# Patient Record
Sex: FEMALE | Race: WHITE | NOT HISPANIC OR LATINO | Employment: FULL TIME | URBAN - METROPOLITAN AREA
[De-identification: names, ages, dates, MRNs, and addresses within clinical notes are randomized per-mention and may not be internally consistent; named-entity substitution may affect disease eponyms.]

---

## 2017-08-16 ENCOUNTER — ALLSCRIPTS OFFICE VISIT (OUTPATIENT)
Dept: OTHER | Facility: OTHER | Age: 57
End: 2017-08-16

## 2017-08-16 DIAGNOSIS — Z13.6 ENCOUNTER FOR SCREENING FOR CARDIOVASCULAR DISORDERS: ICD-10-CM

## 2017-08-16 DIAGNOSIS — F32.9 MAJOR DEPRESSIVE DISORDER, SINGLE EPISODE: ICD-10-CM

## 2017-08-31 ENCOUNTER — APPOINTMENT (OUTPATIENT)
Dept: LAB | Facility: CLINIC | Age: 57
End: 2017-08-31
Payer: COMMERCIAL

## 2017-08-31 DIAGNOSIS — F32.9 MAJOR DEPRESSIVE DISORDER, SINGLE EPISODE: ICD-10-CM

## 2017-08-31 DIAGNOSIS — Z13.6 ENCOUNTER FOR SCREENING FOR CARDIOVASCULAR DISORDERS: ICD-10-CM

## 2017-08-31 LAB
CHOLEST SERPL-MCNC: 182 MG/DL (ref 50–200)
HDLC SERPL-MCNC: 68 MG/DL (ref 40–60)
LDLC SERPL CALC-MCNC: 102 MG/DL (ref 0–100)
TRIGL SERPL-MCNC: 59 MG/DL
TSH SERPL DL<=0.05 MIU/L-ACNC: 1.37 UIU/ML (ref 0.36–3.74)

## 2017-08-31 PROCEDURE — 84443 ASSAY THYROID STIM HORMONE: CPT

## 2017-08-31 PROCEDURE — 36415 COLL VENOUS BLD VENIPUNCTURE: CPT

## 2017-08-31 PROCEDURE — 80061 LIPID PANEL: CPT

## 2017-10-09 ENCOUNTER — ALLSCRIPTS OFFICE VISIT (OUTPATIENT)
Dept: OTHER | Facility: OTHER | Age: 57
End: 2017-10-09

## 2017-10-10 ENCOUNTER — HOSPITAL ENCOUNTER (OUTPATIENT)
Dept: RADIOLOGY | Age: 57
Discharge: HOME | End: 2017-10-10
Payer: COMMERCIAL

## 2017-10-10 DIAGNOSIS — Z12.31 ENCOUNTER FOR SCREENING MAMMOGRAM FOR MALIGNANT NEOPLASM OF BREAST: ICD-10-CM

## 2017-10-10 PROCEDURE — G0202 SCR MAMMO BI INCL CAD: HCPCS

## 2017-10-12 ENCOUNTER — GENERIC CONVERSION - ENCOUNTER (OUTPATIENT)
Dept: OTHER | Facility: OTHER | Age: 57
End: 2017-10-12

## 2017-10-29 NOTE — PROGRESS NOTES
Assessment    1  Encounter for annual routine gynecological examination (V72 31) (Z01 419)   2  Vaginal atrophy (627 3) (N95 2)   3  Encounter for screening breast examination (V76 10) (Z12 31)    Plan   Encounter for annual routine gynecological examination    · Follow-up PRN Evaluation and Treatment  Follow-up  Status: Complete  Done:09Oct2017   Ordered; For: Encounter for annual routine gynecological examination; Ordered By: Janae Eagle Performed:  Due: 48BHK3344  Vaginal atrophy    · Yuvafem 10 MCG Vaginal Tablet; Insert one tabl intravaginally daily x 2 weeks   Rx By: Janae Eagle; Dispense: 0 Days ; #:14 Tablet; Refill: 0;Vaginal atrophy; CAL = N; Verified Transmission to [facility]; Last Updated By: System, Sure[employer]; 10/9/2017 9:25:42 AM   US ABDOMEN LIMITED; Status:Hold For - Scheduling; Requested for:07Oct2016;  Perform:[facility] Radiology; Order Comments:abd bulge directly to upper left of umbilicus; MKW:[id number];BALTNWP; For:Abdominal lump; Ordered By:Jasmina Cherry; Follow-up visit in 1 year Evaluation and Treatment  Follow-up  Status: Hold For - Scheduling  Requested for: 18PDD0021 Ordered; For: Encounter for gynecological examination with abnormal finding;  Ordered By: Janae Eagle  Performed:   Due: 94EOU7240 MAMMO SCREENING BILATERAL W 3D & CAD; Status:Hold For - Scheduling; Requested for:07Oct2017;  Perform:[facility] Radiology; ZUY:31TPZ7307; Ordered;   For:Encounter for screening mammogram for malignant neoplasm of breast; Ordered By:Jasmina Cherry;   (1) THIN PREP PAP WITH IMAGING; Status:Resulted - Requires Verification;   Done: 29IFA2698 12:00AM ZEK:03LWX2047;SIGRIDU;   For:Encounter for routine gynecological examination; Ordered By:Jasmina Cherry; Follow-up visit in 1 year Evaluation and Treatment  Follow-up  Status: Hold For - Scheduling  Requested for: 25TOD9398 Ordered;   For: Encounter for annual routine gynecological examination;  Ordered By: Jones Currie  Performed:   Due: 51GOH8008 * MAMMO SCREENING BILATERAL W CAD; Status:Hold For - Scheduling; Requested HRR:56VIF4484;  Perform:[facility]; TRH:51KAM2490; Ordered;   For:Encounter for screening breast examination; Ordered By:Kirstie Cherry; Discussion/Summary    Calcium 1000 mg + 600 IU Vit D daily  Pap with HR HPV Q 3 years-due 2019, annual mammogram, monthly BSE  Exercise 150 minutes per week minimum  SIlicone based lubricant  Will start yuvafem and call in 2 weeks for refill  Compete colonoscopy  Follow up with rheumatologist       Chief Complaint  Pt due for yv  States no problems pap- 10/07/2016 D-10/04/2016 Negative      History of Present Illness  HPI: Patient to office here for annual visit  Thrilled to have first grandjason-Wilson- 2/17  They live in [place] and they visit Q 2 months  Normal gyn visit one year ago with a double negative pap  No health concerns    Abd hysterectomy-suprcervical and oopherectomy for pelvic pain/endometriosis  No cancer history  Recently traveled to [facility] for work x 1 month  Colonoscopy last year was inconclusive because she was not fully cleansed  She must return in the next year  Last DEXA 8/17-has follow up with rheumatologist this week for results  Back pain with fibromyalgia and attending PT last 10 weeks  GYN HM, Adult Female [facility]:  Social History: She is   Work status: working full time  General Health: Immunizations status: not up to date  Lifestyle:  She exercises regularly  She exercises less than three times a week  -- She does not use tobacco -- She consumes alcohol  She reports occasional alcohol use  Reproductive health:  she reports no menstrual problems  -- she uses no contraception  -- she is sexually active  She is monogamous with a male partner  -- pregnancy history: G 3P 3  Screening:      Review of Systems   Constitutional: No fever, no chills, feels well, no tiredness, no recent weight gain or loss  ENT: no ear ache, no loss of hearing, no nosebleeds or nasal discharge, no sore throat or hoarseness  Cardiovascular: no complaints of slow or fast heart rate, no chest pain, no palpitations, no leg claudication or lower extremity edema  Respiratory: no complaints of shortness of breath, no wheezing, no dyspnea on exertion, no orthopnea or PND  Breasts: no complaints of breast pain, breast lump or nipple discharge  Gastrointestinal: constipation  Genitourinary: no complaints of dysuria, no incontinence, no pelvic pain, no dysmenorrhea, no vaginal discharge or abnormal vaginal bleeding  Musculoskeletal: no complaints of arthralgia, no myalgia, no joint swelling or stiffness, no limb pain or swelling  Integumentary: no complaints of skin rash or lesion, no itching or dry skin, no skin wounds  Neurological: no complaints of headache, no confusion, no numbness or tingling, no dizziness or fainting  ROS reviewed  OB History  Pregnancy History (Brief):  Prior pregnancies: : 3  Para: 3 (living)  Delivery type: vaginal   32 boy, 26 boy, 22 boy-all healthy, no pregnancy problems  Active Problems  1  Anxiety (300 00) (F41 9)   2  Fibromyalgia (729 1) (M79 7)   3  MDD (major depressive disorder) (296 20) (F32 9)   4  Osteoporosis (733 00) (M81 0)   5  Rheumatoid arthritis (714 0) (M06 9)   6  Upper back pain on left side (724 5) (M54 9)   7  Vitamin D deficiency (268 9) (E55 9)    Past Medical History   · History of endometriosis (V13 29) (Z87 42)   · History of Screening for diabetes mellitus (V77 1) (Z13 1)   · History of Screening for thyroid disorder (V77 0) (Z13 29)    The active problems and past medical history were reviewed and updated today  Surgical History   · History of Tonsillectomy   · History of Total Abdominal Hysterectomy    The surgical history was reviewed and updated today         Family History  Mother    · Family history of hypertension (V17 49) (Z82 49)   · Family history of hypothyroidism (V18 19) (Z83 49)   · Family history of osteoporosis (V17 81) (Z82 62)   · Family history of pulmonary embolism (V17 49) (Z82 49)  Father    · Family history of cardiac disorder (V17 49) (Z82 49)   · Family history of chronic lymphoid leukemia (V16 6) (Z80 6)  Brother    · Family history of Emphysema lung  Maternal Grandmother    · Family history of osteoporosis (V17 81) (Z82 62)   · Family history of thyroid disease (V18 19) (Z83 49)   · Family history of transient ischemic attacks (V17 1) (Z82 3)  Maternal Aunt    · Family history of malignant neoplasm of thyroid (V16 8) (Z80 8)    The family history was reviewed and updated today  M great aunt with breast cancer  Dx at unknown age  Social History     · Alcohol use (V49 89) (Z78 9)   · Currently sexually active   · Daily caffeine consumption, 2-3 servings a day   · Denied: History of Drug use   · Full-time employment   ·    · 30 year-happily   · Never a smoker   · Occupation   · Works for [employer], [employer], FT, traveled to [facility]   · Three children  The social history was reviewed and updated today  Current Meds   1  CloNIDine HCl - 0 1 MG Oral Tablet; Therapy: (Recorded:09Oct2017) to Recorded   2  Cymbalta 60 MG Oral Capsule Delayed Release Particles; TAKE 1 CAPSULE EVERY DAY; Therapy: (Recorded:12Aug2015) to Recorded   3  FiberCon 625 MG Oral Tablet; USE AS DIRECTED; Therapy: (Recorded:23Zrt5078) to Recorded   4  Folic Acid 1 MG Oral Tablet; Therapy: (Recorded:12Aug2015) to Recorded   5  Fosamax 70 MG Oral Tablet; TAKE 1 TABLET ONCE WEEKLY; Therapy: (Recorded:12Aug2015) to Recorded   6  Humira Pen 40 MG/0 8ML KIT; INJECT 40 MG SUBCUTANEOUSLY EVERY two weeks; Therapy: (Recorded:12Aug2015) to Recorded   7  Lyrica 100 MG Oral Capsule; TAKE 1 CAPSULE EVERY DAY in AM; Therapy: (Recorded:12Aug2015) to Recorded   8  Methotrexate 2 5 MG Oral Tablet; TAKE 8 TABLETS PER WEEK;  Therapy: (Recorded:12Aug2015) to Recorded   9  Turmeric CAPS; take 1 capsule daily; Therapy: (Recorded:96Oul8193) to Recorded   10  Vitamin D 2000 UNIT Oral Tablet; Therapy: (Recorded:25Tgo1517) to Recorded   11  Zaleplon 10 MG Oral Capsule; Therapy: (Recorded:89Bjl2273) to Recorded    Allergies  1  No Known Drug Allergies    Vitals   Recorded: 58EJN5912 34:53VI   Systolic 98, RUE, Sitting   Diastolic 60, RUE, Sitting   Height 5 ft 4 in   Weight 136 lb 4 oz   BMI Calculated 23 39   BSA Calculated 1 66       Physical Exam   Constitutional  General appearance: No acute distress, well appearing and well nourished  Neck  Neck: Normal, supple, trachea midline, no masses  Thyroid: Normal, no thyromegaly  Pulmonary  Respiratory effort: No increased work of breathing or signs of respiratory distress  Auscultation of lungs: Clear to auscultation  Cardiovascular  Auscultation of heart: Normal rate and rhythm, normal S1 and S2, no murmurs  Peripheral vascular exam: Normal pulses Throughout  Genitourinary  External genitalia: Abnormal  -- atrophic  Vagina: Abnormal  -- atrophic, pale, smooth  Urethra: Normal    Urethral meatus: Abnormal  -- slight prolapse  Bladder: Normal, soft, non-tender and no prolapse or masses appreciated  Cervix: Normal, no palpable masses  -- smooth, pap due 2019  Uterus: Surgically absent  Adnexa/parametria: Surgically absent  Anus, perineum, and rectum: Normal sphincter tone, no masses, and no prolapse  Visually normal  Chest  Breasts: Normal and no dimpling or skin changes noted  Abdomen  Abdomen: Normal, non-tender, and no organomegaly noted  Liver and spleen: No hepatomegaly or splenomegaly  Examination for hernias: No hernias appreciated  Lymphatic  Palpation of lymph nodes in neck, axillae, groin and/or other locations: No lymphadenopathy or masses noted  Skin  Skin and subcutaneous tissue: Normal skin turgor and no rashes     Palpation of skin and subcutaneous tissue: Normal    Psychiatric Orientation to person, place, and time: Normal    Mood and affect: Normal        Future Appointments    Date/Time Provider Specialty Site   11/16/2017 08:45 AM Bhaskar Murray DO Internal Medicine [address]   Electronically signed by :  JADEN Zhao; Oct  9 2017  9:25AM EST                       (Author)    Electronically signed by : KYLE Reece ; Oct 10 2017  2:39PM EST                       (Author)

## 2017-11-16 ENCOUNTER — ALLSCRIPTS OFFICE VISIT (OUTPATIENT)
Dept: OTHER | Facility: OTHER | Age: 57
End: 2017-11-16

## 2018-01-11 NOTE — RESULT NOTES
Verified Results  (1) THIN PREP PAP WITH IMAGING [id number] 09:54AM Hoa POZO Order Number: [id number]     Test Name Result Flag Reference   LAB AP CASE REPORT (Report)     Gynecologic Cytology Report            Case: [id number]                  Authorizing Provider: JADEN oTvar   Collected:      10/07/2016 0109        First Screen:     IFEANYI Ro    Received:      10/10/2016 1052        Specimen:  LIQUID-BASED PAP, SCREENING, Cervix   HPV HIGH RISK RESULT (Report)     HPV, High Risk: HPV NEG, HPV16 NEG, HPV18 NEG      Other High Risk HPV Negative, HPV 16 Negative, HPV 18 Negative  HPV types: 16,18,31,33,35,39,45,51,52,56,58,59,66 and 68 DNA are undetectable or below the pre-set threshold  Roche?s FDA approved Wyatt 4800 is utilized with strict adherence to the ?s instruction  manual to test for the presence of High-Risk HPV DNA, as well as HPV 16 and HPV 18  This instrument  has been validated by our laboratory and/or by the   A negative result does not preclude the presence of HPV infection because results depend on adequate  specimen collection, absence of inhibitors and sufficient DNA to be detected  Additionally, HPV negative  results are not intended to prevent women from proceeding to colposcopy if clinically warranted  Positive HPV test results indicate the presence of any one or more of the high risk types, but since patients  are often co-infected with low-risk types it does not rule out the presence of low-risk types in patients  with mixed infections  LAB AP GYN PRIMARY INTERPRETATION      Negative for intraepithelial lesion or malignancy  Electronically signed by IFEANYI Ro on 10/13/2016 at 2:10 PM   LAB AP GYN SPECIMEN ADEQUACY      Satisfactory for evaluation  Endocervical/transformation zone component present     LAB AP GYN ADDITIONAL INFORMATION (Report)     Hologic's FDA approved ,  and ThinPrep Imaging System are   utilized with strict adherence to the 's instruction manual to   prepare gynecologic and non-gynecologic cytology specimens for the   production of ThinPrep slides as well as for gynecologic ThinPrep imaging  These processes have been validated by our laboratory and/or by the     The Pap test is not a diagnostic procedure and should not be used as the   sole means to detect cervical cancer  It is only a screening procedure to   aid in the detection of cervical cancer and its precursors  Both   false-negative and false-positive results have been experienced  Your   patient's test result should be interpreted in this context together with   the history and clinical findings

## 2018-01-11 NOTE — RESULT NOTES
Verified Results  * MAMMO SCREENING BILATERAL W CAD [id number] 12:28PM Cammy Harvey Order Number: [id number]    - Patient Instructions: To schedule this appointment, please contact Central Scheduling at [phone]  Do not wear any perfume, powder, lotion or deodorant on breast or underarm area  Please bring your doctors order, referral (if needed) and insurance information with you on the day of the test  Failure to bring this information may result in this test being rescheduled  Arrive 15 minutes prior to your appointment time to register  On the day of your test, please bring any prior mammogram or breast studies with you that were not performed at a [facility]  Failure to bring prior exams may result in your test needing to be rescheduled  Test Name Result Flag Reference   MAMMO SCREENING BILATERAL W CAD (Report)     Patient History:   Patient is postmenopausal    No known family history of cancer  Took hormonal contraceptives for 10 years  Took estrogen for 2    years  Patient has never smoked  Patient's BMI is 22 0      Reason for exam: screening, asymptomatic  Mammo Screening Bilateral W CAD: October 10, 2017 - Check In #:    [de-identified]   Bilateral CC and MLO view(s) were taken  Technologist: RT Eleanor(R)(M)   Prior study comparison: October 4, 2016, mammo screening    bilateral W CAD performed at [address] [place] [address]  January 13, 2012, bilateral digital screening mammogram,    performed at [place] [address]  January 12, 2011, bilateral digital screening mammogram, performed at    [place] [address]  January 11, 2010,    bilateral digital screening mammogram, performed at [place] [address]  January 8, 2009, bilateral    DIGITAL SCREENING MAMMOGRAM, performed at [place] [address]  There are scattered fibroglandular densities     The parenchymal pattern appears stable  No dominant soft tissue    mass or suspicious calcifications are noted  The skin and nipple   contours are within normal limits  No mammographic evidence of malignancy  No    significant changes when compared with prior studies  ACR BI-RADSï¾® Assessments: BiRad:1 - Negative     Recommendation:   Routine screening mammogram in 1 year  Analyzed by CAD     The patient is scheduled in a reminder system for screening    mammography  8-10% of cancers will be missed on mammography  Management of a    palpable abnormality must be based on clinical grounds  Patients   will be notified of their results via letter from our facility  Accredited by [employer] of Radiology and FDA       Transcription Location:  [place] [facility]: [id number]     Risk Value(s):   Tyrer-Cuzick 10 Year: 2 300%, Tyrer-Cuzick Lifetime: 8 300%,    Myriad Table: 1 5%, ALTA 5 Year: 1 1%, NCI Lifetime: 8 2%   Signed by:   Andra Jaffe MD   10/11/17

## 2018-01-13 VITALS
OXYGEN SATURATION: 98 % | HEART RATE: 92 BPM | DIASTOLIC BLOOD PRESSURE: 70 MMHG | BODY MASS INDEX: 23.11 KG/M2 | TEMPERATURE: 98.2 F | WEIGHT: 135.38 LBS | SYSTOLIC BLOOD PRESSURE: 110 MMHG | RESPIRATION RATE: 16 BRPM | HEIGHT: 64 IN

## 2018-01-13 VITALS
WEIGHT: 136.25 LBS | BODY MASS INDEX: 23.26 KG/M2 | SYSTOLIC BLOOD PRESSURE: 98 MMHG | HEIGHT: 64 IN | DIASTOLIC BLOOD PRESSURE: 60 MMHG

## 2018-01-14 VITALS
BODY MASS INDEX: 22.88 KG/M2 | RESPIRATION RATE: 16 BRPM | WEIGHT: 134 LBS | TEMPERATURE: 97.3 F | SYSTOLIC BLOOD PRESSURE: 120 MMHG | HEIGHT: 64 IN | HEART RATE: 75 BPM | OXYGEN SATURATION: 96 % | DIASTOLIC BLOOD PRESSURE: 80 MMHG

## 2018-01-14 NOTE — PROGRESS NOTES
Assessment    1  Encounter for preventive health examination (V70 0) (Z00 00)    Plan  Health Maintenance    · Follow-up visit in 1 year Evaluation and Treatment  Physical  Status: Hold For -  Scheduling  Requested for: 28XUI3104   · Begin a limited exercise program ; Status:Complete;   Done: 96JGU3104   · Drink plenty of fluids ; Status:Complete;   Done: 79LLK8523   · Eat a low fat and low cholesterol diet ; Status:Complete;   Done: 41MJP7185   · Limit your use of alcohol to 2 drinks or cans of beer a day ; Status:Complete;   Done:  97URF1373   · Regular aerobic exercise can help reduce stress ; Status:Complete;   Done: 05ZNA7868   · We recommend routine visits to a dentist ; Status:Complete;   Done: 63QYT0845   · We recommend that you follow these steps to lower your risk of osteoporosis  ;  Status:Complete;   Done: 50PUX1828    Discussion/Summary  health maintenance visit healthy adult female Currently, she eats a healthy diet and has an adequate exercise regimen  cervical cancer screening is not indicated Had [facility] Breast cancer screening: the risks and benefits of breast cancer screening were discussed and mammogram is current  Colorectal cancer screening: the risks and benefits of colorectal cancer screening were discussed, the next colonoscopy is due 12/2017 and colorectal cancer screening is managed by Dr Ebonie Frye  The risks and benefits of immunizations were discussed, immunizations are needed and patient declines immunizations  Advice and education were given regarding nutrition, aerobic exercise, weight bearing exercise, weight loss, calcium supplements, vitamin D supplements, cardiovascular risk reduction and alcohol use  Patient discussion: discussed with the patient, 35 minute visit, greater than half of the time was spent on counseling  History of Present Illness  , Adult Female: The patient is being seen for a health maintenance evaluation  The last health maintenance visit was 1 year(s) ago  Social History: Household members include spouse  She is   Work status: currently on disability  The patient has never smoked cigarettes  She reports occasional alcohol use and drinking 1-2 drinks per week  General Health: The patient's health since the last visit is described as good  She has regular dental visits (Followed by Dr Millie Byers)  The patient brushes time(s) a day, flosses time(s) a day and reports her last dental visit was 10/2017  She complains of vision problems (Followed by Dr Aisha Hansen and Janeth Elam)  Vision care includes wearing glasses and an eye examination within the last year  She denies hearing loss  Immunizations status: not up to date The patient needs the following immunization(s): pneumococcal vaccine  Lifestyle:  She consumes a diverse and healthy diet (avoiding processed foods, dairy, soy and gluten)  She is on a low salt diet  She exercises regularly  She exercises times per week for 30 or more minutes per session  Exercise includes walking and does home exercises and does aqua fit class three times per week  She does not use tobacco  She consumes alcohol  She reports occasional alcohol use and 1-2 drinks per month  She typically drinks wine coolers  Reproductive health: the patient is postmenopausal   had hysterectomy  Screening: Cervical cancer screening includes s/pTAH-BSO for endometriosis  Breast cancer screening includes a mammogram performed 10/10/17  Colorectal cancer screening includes a colonoscopy performed 12/9/16 and performed by Dr Ebonie Frye, repeat 6-12mos due to poor prep  Metabolic screening includes lipid profile performed 8/2017, glucose screening performed 8/2017, thyroid function test performed 8/2017 and DEXA performed 2017  Safety elements used: seat belt, smoke detector and carbon monoxide detector, but no sunscreen     HPI: 50yo female with osteoporosis, RA and fibromyalgia here for yearly physical  She had recent changes in her medications in response to flares of fibromyalgia relating to increased stress from her brother's illness earlier this year  She is in physical therapy and doing aquatic exercises with improvement  She is currently on disability  Review of Systems    Constitutional: No fever, no chills, feels well, no tiredness, no recent weight gain or weight loss  Cardiovascular: No complaints of slow heart rate, no fast heart rate, no chest pain, no palpitations, no leg claudication, no lower extremity edema  Respiratory: shortness of breath during exertion, but no cough  Gastrointestinal: occasional dysphagia, but no abdominal pain, no nausea and no heartburn  Genitourinary: No complaints of dysuria, no incontinence, no pelvic pain, no dysmenorrhea, no vaginal discharge or bleeding  Musculoskeletal: arthralgias, joint swelling and myalgias  Neurological: headache, but no numbness, no tingling and no dizziness  Psychiatric: anxiety, sleep disturbances and depression  Active Problems    1  Anxiety (300 00) (F41 9)   2  Encounter for annual routine gynecological examination (V72 31) (Z01 419)   3  Encounter for screening breast examination (V76 10) (Z12 31)   4  Fibromyalgia (729 1) (M79 7)   5  MDD (major depressive disorder) (296 20) (F32 9)   6  Osteoporosis (733 00) (M81 0)   7  Rheumatoid arthritis (714 0) (M06 9)   8  Vaginal atrophy (627 3) (N95 2)   9   Vitamin D deficiency (268 9) (E55 9)    Past Medical History    · History of endometriosis (V13 29) (Z87 42)   · History of Screening for diabetes mellitus (V77 1) (Z13 1)   · History of Screening for thyroid disorder (V77 0) (Z13 29)   · History of Upper back pain on left side (724 5) (M54 9)    Surgical History    · History of Oral Surgery Tooth Extraction   · History of Tonsillectomy   · History of Total Abdominal Hysterectomy    Family History  Mother    · Family history of hypertension (V17 49) (Z82 49)   · Family history of hypothyroidism (V18 19) (Z83 49)   · Family history of osteoporosis (V17 81) (Z82 62)   · Family history of pulmonary embolism (V17 49) (Z82 49)  Father    · Family history of cardiac disorder (V17 49) (Z82 49)   · Family history of chronic lymphoid leukemia (V16 6) (Z80 6)  Brother    · Family history of Emphysema lung  Maternal Grandmother    · Family history of osteoporosis (V17 81) (Z82 62)   · Family history of thyroid disease (V18 19) (Z83 49)   · Family history of transient ischemic attacks (V17 1) (Z82 3)  Maternal Aunt    · Family history of malignant neoplasm of thyroid (V16 8) (Z80 8)    Social History    · Alcohol use (V49 89) (Z78 9)   · Currently sexually active   · Daily caffeine consumption, 2-3 servings a day   · Denied: History of Drug use   · Full-time employment   ·    · 27 year-happily   · Never a smoker   · Occupation   · Works for [employer], [employer], FT, traveled to [facility]   · Three children    Current Meds   1  CloNIDine HCl - 0 1 MG Oral Tablet; take 1/2 in the am and 1 tab in the evening; Therapy: (Recorded:16Nov2017) to Recorded   2  Cymbalta 60 MG Oral Capsule Delayed Release Particles; TAKE 1 CAPSULE TWICE   DAILY; Therapy: (Recorded:16Nov2017) to Recorded   3  FiberCon 625 MG Oral Tablet; USE AS DIRECTED; Therapy: (Recorded:16Aug2017) to Recorded   4  Folic Acid 1 MG Oral Tablet; TAKE 1 TABLET DAILY AS DIRECTED; Therapy: (Recorded:16Nov2017) to Recorded   5  Humira Pen 40 MG/0 8ML KIT; INJECT 40 MG SUBCUTANEOUSLY EVERY two weeks; Therapy: (Recorded:12Xul9313) to Recorded   6  Lyrica 100 MG Oral Capsule; TAKE 1 CAPSULE TWICE DAILY; Therapy: (Recorded:16Nov2017) to Recorded   7  Magnesium 200 MG Oral Tablet; TAKE 1 TABLET DAILY AS DIRECTED; Therapy: 11XMR6738 to Recorded   8  Methotrexate 2 5 MG Oral Tablet; TAKE 8 TABLETS PER WEEK; Therapy: (Recorded:12Aug2015) to Recorded   9  Turmeric CAPS; Take 1 capsule twice daily; Therapy: (Recorded:16Nov2017) to Recorded   10   Vitamin D 2000 UNIT Oral Tablet; Take 2 tablets daily; Therapy: (Recorded:16Nov2017) to Recorded   11  Yuvafem 10 MCG Vaginal Tablet; Insert intravaginally twice weekly; Therapy: 18ARN4484 to (Last Rx:09Nov2017)  Requested for: 50JMB5269 Ordered   12  Zaleplon 10 MG Oral Capsule; TAKE 2 CAPSULES AT BEDTIME AS NEEDED; Therapy: (Recorded:16Nov2017) to Recorded    Allergies    1  No Known Drug Allergies    Vitals   Recorded: 47UXL4292 08:38AM   Temperature 97 3 F   Heart Rate 75   Respiration 16   Systolic 941   Diastolic 80   Height 5 ft 4 in   Weight 134 lb    BMI Calculated 23   BSA Calculated 1 65   O2 Saturation 96     Physical Exam    Constitutional   General appearance: No acute distress, well appearing and well nourished  Head and Face   Head and face: Normal   wears glasses  Eyes   Conjunctiva and lids: No swelling, erythema or discharge  Ears, Nose, Mouth, and Throat   External inspection of ears and nose: Normal     Otoscopic examination: Tympanic membranes translucent with normal light reflex  Canals patent without erythema  Hearing: Normal     Nasal mucosa, septum, and turbinates: Normal without edema or erythema  Lips, teeth, and gums: Normal, good dentition  Oropharynx: Normal with no erythema, edema, exudate or lesions  Neck   Neck: Supple, symmetric, trachea midline, no masses  Thyroid: Normal, no thyromegaly  Pulmonary   Respiratory effort: No increased work of breathing or signs of respiratory distress  Auscultation of lungs: Clear to auscultation  Cardiovascular   Auscultation of heart: Normal rate and rhythm, normal S1 and S2, no murmurs  Carotid pulses: 2+ bilaterally  Pedal pulses: 2+ bilaterally  Examination of extremities for edema and/or varicosities: Normal     Chest Patient deferred  Abdomen   Abdomen: Non-tender, no masses  The abdomen was flat  Bowel sounds were normal  The abdomen was soft and nontender  The abdomen was normal to percussion  Lymphatic   Palpation of lymph nodes in neck: No lymphadenopathy  Musculoskeletal   Gait and station: Normal     Neurologic No focal deficit     Psychiatric   Orientation to person, place, and time: Normal     Mood and affect: Normal        Signatures   Electronically signed by : Chary Fleming DO; Nov 16 2017  9:11AM EST                       (Author)

## 2018-01-16 NOTE — RESULT NOTES
Verified Results  [address] [place] [address] [id number] 08:59AM [facility] Order Number: [id number]   Performing Comments: abd bulge directly to upper left of umbilicus   - Patient Instructions: To schedule this appointment, please contact Central Scheduling at [phone]   Order Number: [id number]   Performing Comments: abd bulge directly to upper left of umbilicus   - Patient Instructions: To schedule this appointment, please contact Central Scheduling at [phone]  Test Name Result Flag Reference   US ABDOMEN LIMITED (Report)     RIGHT ABDOMINAL WALL ULTRASOUND     INDICATION: Palpable abnormality to the right of the umbilicus       COMPARISON: None  TECHNIQUE:  Real-time ultrasound of the right abdominal wall was performed with a linear transducer with both volumetric sweeps and still imaging techniques  FINDINGS: No discrete hernia, mass lesion or fluid collection visualized  IMPRESSION:     No acute findings         Workstation performed: [id number]     Signed by:   Chanel Hdez MD   10/8/16

## 2018-01-18 NOTE — PROGRESS NOTES
Assessment    1  Encounter for preventive health examination (V70 0) (Z00 00)    Plan  Depression    · (1) TSH WITH FT4 REFLEX; Status:Active; Requested FF:06AKG2206; Health Maintenance    · Follow-up visit in 6 months Evaluation and Treatment  Follow-up  Status: Hold For -  Scheduling  Requested for: 61Hoc3225   · Begin a limited exercise program ; Status:Complete;   Done: 22SGF1618   · Drink plenty of fluids ; Status:Complete;   Done: 20UXQ6323   · Eat a low fat and low cholesterol diet ; Status:Complete;   Done: 50RBS7095   · Limit your use of alcohol to 2 drinks or cans of beer a day ; Status:Complete;   Done:  31EIW6101   · We recommend routine visits to a dentist ; Status:Complete;   Done: 26HFD7339   · We recommend that you follow these steps to lower your risk of osteoporosis  ;  Status:Complete;   Done: 08KDB6797  Screening for breast cancer    · * MAMMO SCREENING BILATERAL W CAD; Status:Hold For - Scheduling; Requested  for:23Dqu7741;   Screening for cardiovascular condition    · (1) LIPID PANEL FASTING W DIRECT LDL REFLEX; Status:Active; Requested  GHQ:98THD4216;   Screening for colon cancer    · COLONOSCOPY; Status:Active; Requested for:11Rdh7153;     Discussion/Summary  health maintenance visit healthy adult female Currently, she eats a healthy diet and has an adequate exercise regimen  the risks and benefits of cervical cancer screening were discussed refer to GYN Breast cancer screening: the risks and benefits of breast cancer screening were discussed, monthly self breast exam was advised and mammogram has been ordered  Colorectal cancer screening: the risks and benefits of colorectal cancer screening were discussed and colonoscopy has been ordered  Osteoporosis screening: bone mineral density testing is current, the next bone mineral density test is due 2017 and ordered by her Rheumatologist  Screening lab work includes lipid profile and thyroid function testing   The risks and benefits of immunizations were discussed, immunizations are needed and [facility]  Advice and education were given regarding nutrition, aerobic exercise, weight bearing exercise, weight loss, calcium supplements, vitamin D supplements, alcohol use and sunscreen use  Patient discussion: discussed with the patient, 30 minute visit, greater than half of the time was spent on counseling  Depression screen - positive, moderate in severity  Patient has been going to a counselor at her [facility]  Obtain labs  Follow up in 6 months or sooner  History of Present Illness  HM, Adult Female: The patient is being seen for a health maintenance evaluation  The last health maintenance visit was 1 year(s) ago  Social History: Household members include spouse  She is   The patient has never smoked cigarettes  She reports occasional alcohol use and drinking 1-2 drinks per week  General Health: The patient's health since the last visit is described as good  She has regular dental visits (Followed by Dr Radha Light)  The patient brushes time(s) a day, flosses time(s) a day and reports her last dental visit was 9/2016  She complains of vision problems (Followed by Dr Otto Sheikh and Stephanie Park)  Vision care includes wearing glasses and an eye examination within the last year  She denies hearing loss  Immunizations status: not up to date (Plans to go to [facility] for free influenza vaccine) The patient needs the following immunization(s): influenza vaccine and pneumococcal vaccine  Lifestyle:  She consumes a diverse and healthy diet (avoiding processed foods, dairy, soy and gluten)  She is on a low salt diet  She exercises regularly  She exercises less than three times a week for 30 or more minutes per session  Exercise includes walking and stretching/yoga/pilates  She does not use tobacco  She consumes alcohol  She reports occasional alcohol use and 1-2 drinks per month   She typically drinks wine coolers  Reproductive health: the patient is postmenopausal   had hysterectomy  Screening: Cervical cancer screening includes s/pTAH-BSO for endometriosis  Breast cancer screening includes a mammogram performed several years ago  She hasn't been previously screened for colorectal cancer  Metabolic screening includes lipid profile performed 2015, glucose screening performed 2016, thyroid function test performed 2015 and DEXA performed 2015  Safety elements used: seat belt, smoke detector and carbon monoxide detector, but no sunscreen  HPI: 52yo female with h/o osteoporosis, RA and fibromyalgia here for yearly physical          Review of Systems    Constitutional: feeling tired and recent weight loss  ENT: nasal discharge  Cardiovascular: No complaints of slow heart rate, no fast heart rate, no chest pain, no palpitations, no leg claudication, no lower extremity edema  Respiratory: No complaints of shortness of breath, no wheezing, no cough, no SOB on exertion, no orthopnea, no PND  Gastrointestinal: No complaints of abdominal pain, no constipation, no nausea or vomiting, no diarrhea, no bloody stools  Genitourinary: No complaints of dysuria, no incontinence, no pelvic pain, no dysmenorrhea, no vaginal discharge or bleeding  Musculoskeletal: arthralgias and joint swelling  Integumentary: No complaints of skin rash or lesions, no itching, no skin wounds, no breast pain or lump  Neurological: headache, but no numbness, no tingling and no dizziness  Psychiatric: sleep disturbances and depression  Active Problems    1  Fibromyalgia (729 1) (M79 7)   2  Osteoporosis (733 00) (M81 0)   3  Rheumatoid arthritis (714 0) (M06 9)   4  Screening for breast cancer (V76 10) (Z12 39)   5  Screening for cardiovascular condition (V81 2) (Z13 6)   6  Screening for colon cancer (V76 51) (Z12 11)   7  Screening for diabetes mellitus (V77 1) (Z13 1)   8   Screening for thyroid disorder (V77 0) (Z13 29)   9  Upper back pain on left side (724 5) (M54 9)   10  Vitamin D deficiency (268 9) (E55 9)    Past Medical History    · History of endometriosis (V13 29) (Z87 42)    Surgical History    · History of Tonsillectomy   · History of Total Abdominal Hysterectomy    Family History  Mother    · Family history of hypertension (V17 49) (Z82 49)   · Family history of hypothyroidism (V18 19) (Z83 49)   · Family history of osteoporosis (V17 81) (Z82 62)   · Family history of pulmonary embolism (V17 49) (Z82 49)  Father    · Family history of cardiac disorder (V17 49) (Z82 49)   · Family history of chronic lymphoid leukemia (V16 6) (Z80 6)  Brother    · Family history of Emphysema lung  Maternal Grandmother    · Family history of osteoporosis (V17 81) (Z82 62)   · Family history of thyroid disease (V18 19) (Z83 49)   · Family history of transient ischemic attacks (V17 1) (Z82 3)  Maternal Aunt    · Family history of malignant neoplasm of thyroid (V16 8) (Z80 8)    Social History    · Alcohol use (V49 89) (Z78 9)   · Full-time employment   ·    · Never a smoker   · Occupation   · Works for [employer], [employer]   · Three children    Current Meds   1  Cymbalta 60 MG Oral Capsule Delayed Release Particles; TAKE 1 CAPSULE EVERY   DAY; Therapy: (Recorded:27Phj7571) to Recorded   2  FiberCon 625 MG Oral Tablet; Therapy: (Recorded:97Izp1259) to Recorded   3  Folic Acid 1 MG Oral Tablet; Therapy: (Recorded:12Aug2015) to Recorded   4  Fosamax 70 MG Oral Tablet; TAKE 1 TABLET ONCE WEEKLY; Therapy: (Recorded:12Aug2015) to Recorded   5  Humira Pen 40 MG/0 8ML KIT; INJECT 40 MG SUBCUTANEOUSLY EVERY two weeks; Therapy: (Recorded:12Aug2015) to Recorded   6  Lyrica 100 MG Oral Capsule; TAKE 1 CAPSULE EVERY DAY in AM;   Therapy: (Recorded:12Aug2015) to Recorded   7  Methotrexate 2 5 MG Oral Tablet; TAKE 8 TABLETS PER WEEK; Therapy: (Recorded:12Aug2015) to Recorded   8   Vitamin D 2000 UNIT Oral Tablet; Therapy: (Recorded:66Mzk7074) to Recorded    Allergies    1  No Known Drug Allergies    Vitals   Recorded: 76SLN9835 12:32JP   Systolic 98   Diastolic 66   Heart Rate 75   Respiration 14   Temperature 98 7 F   O2 Saturation 95   Height 5 ft 4 in   Weight 130 lb 8 0 oz   BMI Calculated 22 4   BSA Calculated 1 64     Physical Exam    Constitutional   General appearance: No acute distress, well appearing and well nourished  Head and Face   Head and face: Normal   wears glasses  Eyes   Conjunctiva and lids: No swelling, erythema or discharge  Ears, Nose, Mouth, and Throat   External inspection of ears and nose: Normal     Otoscopic examination: Tympanic membranes translucent with normal light reflex  Canals patent without erythema  Hearing: Normal     Nasal mucosa, septum, and turbinates: Normal without edema or erythema  Lips, teeth, and gums: Normal, good dentition  Oropharynx: Normal with no erythema, edema, exudate or lesions  Neck   Neck: Supple, symmetric, trachea midline, no masses  Thyroid: Normal, no thyromegaly  Pulmonary   Respiratory effort: No increased work of breathing or signs of respiratory distress  Auscultation of lungs: Clear to auscultation  Cardiovascular   Auscultation of heart: Normal rate and rhythm, normal S1 and S2, no murmurs  Carotid pulses: 2+ bilaterally  Pedal pulses: 2+ bilaterally  Examination of extremities for edema and/or varicosities: Normal     Chest   Breasts: Normal, no dimpling or skin changes appreciated  Palpation of breasts and axillae: Normal, no masses palpated  Chest: Normal     Abdomen   Abdomen: Non-tender, no masses  The abdomen was flat  Bowel sounds were normal  The abdomen was soft and nontender  The abdomen was normal to percussion  Lymphatic   Palpation of lymph nodes in neck: No lymphadenopathy  Musculoskeletal   Gait and station: Normal     Neurologic No focal deficit     Psychiatric   Orientation to person, place, and time: Normal     Mood and affect: Normal        Signatures   Electronically signed by : Maria Del Carmen Kaur DO; Sep 19 2016  4:04PM EST                       (Author)

## 2018-10-15 ENCOUNTER — HOSPITAL ENCOUNTER (OUTPATIENT)
Dept: RADIOLOGY | Age: 58
Discharge: HOME | End: 2018-10-15
Payer: COMMERCIAL

## 2018-10-15 ENCOUNTER — TRANSCRIBE ORDERS (OUTPATIENT)
Dept: ADMINISTRATIVE | Age: 58
End: 2018-10-15

## 2018-10-15 DIAGNOSIS — Z12.31 VISIT FOR SCREENING MAMMOGRAM: ICD-10-CM

## 2018-10-15 DIAGNOSIS — Z12.31 ENCOUNTER FOR SCREENING MAMMOGRAM FOR MALIGNANT NEOPLASM OF BREAST: ICD-10-CM

## 2018-10-15 DIAGNOSIS — Z12.31 VISIT FOR SCREENING MAMMOGRAM: Primary | ICD-10-CM

## 2018-10-15 PROCEDURE — 77063 BREAST TOMOSYNTHESIS BI: CPT

## 2018-10-15 PROCEDURE — 77067 SCR MAMMO BI INCL CAD: CPT

## 2018-10-22 ENCOUNTER — ANNUAL EXAM (OUTPATIENT)
Dept: GYNECOLOGY | Facility: CLINIC | Age: 58
End: 2018-10-22
Payer: COMMERCIAL

## 2018-10-22 VITALS
SYSTOLIC BLOOD PRESSURE: 100 MMHG | WEIGHT: 137 LBS | HEART RATE: 68 BPM | BODY MASS INDEX: 23.39 KG/M2 | DIASTOLIC BLOOD PRESSURE: 62 MMHG | HEIGHT: 64 IN

## 2018-10-22 DIAGNOSIS — N95.2 VAGINAL ATROPHY: ICD-10-CM

## 2018-10-22 DIAGNOSIS — Z01.411 ENCNTR FOR GYN EXAM (GENERAL) (ROUTINE) W ABNORMAL FINDINGS: Primary | ICD-10-CM

## 2018-10-22 DIAGNOSIS — Z12.31 ENCOUNTER FOR SCREENING MAMMOGRAM FOR BREAST CANCER: ICD-10-CM

## 2018-10-22 PROCEDURE — S0612 ANNUAL GYNECOLOGICAL EXAMINA: HCPCS | Performed by: NURSE PRACTITIONER

## 2018-10-22 RX ORDER — METHYLPREDNISOLONE 4 MG/1
TABLET ORAL
COMMUNITY
Start: 2018-09-17 | End: 2018-10-22 | Stop reason: ALTCHOICE

## 2018-10-22 RX ORDER — CLONIDINE HYDROCHLORIDE 0.1 MG/1
0.1 TABLET ORAL ONCE
COMMUNITY
Start: 2018-10-18

## 2018-10-22 RX ORDER — ESTRADIOL 10 UG/1
TABLET VAGINAL
COMMUNITY
Start: 2018-10-15 | End: 2018-10-22 | Stop reason: SDUPTHER

## 2018-10-22 RX ORDER — DULOXETIN HYDROCHLORIDE 60 MG/1
60 CAPSULE, DELAYED RELEASE ORAL 2 TIMES DAILY
COMMUNITY
Start: 2018-10-15

## 2018-10-22 RX ORDER — CALCIUM POLYCARBOPHIL 625 MG 625 MG/1
TABLET ORAL
COMMUNITY
End: 2018-10-22 | Stop reason: ALTCHOICE

## 2018-10-22 RX ORDER — METHOTREXATE 2.5 MG/1
2.5 TABLET ORAL
COMMUNITY
Start: 2018-10-15 | End: 2022-02-15 | Stop reason: SDUPTHER

## 2018-10-22 RX ORDER — PREGABALIN 100 MG/1
1 CAPSULE ORAL 2 TIMES DAILY
COMMUNITY

## 2018-10-22 RX ORDER — ADALIMUMAB 40MG/0.8ML
40 KIT SUBCUTANEOUS
COMMUNITY
Start: 2018-09-01 | End: 2022-04-01 | Stop reason: ALTCHOICE

## 2018-10-22 RX ORDER — FOLIC ACID 1 MG/1
TABLET ORAL
COMMUNITY
Start: 2018-09-07 | End: 2022-04-18 | Stop reason: SDUPTHER

## 2018-10-22 RX ORDER — CLONIDINE HYDROCHLORIDE 0.1 MG/1
TABLET ORAL
COMMUNITY
End: 2018-10-22 | Stop reason: ALTCHOICE

## 2018-10-22 RX ORDER — CHOLECALCIFEROL (VITAMIN D3) 50 MCG
2 TABLET ORAL DAILY
COMMUNITY

## 2018-10-22 RX ORDER — MAGNESIUM 200 MG
1 TABLET ORAL DAILY
COMMUNITY
Start: 2017-11-16

## 2018-10-22 RX ORDER — DULOXETIN HYDROCHLORIDE 60 MG/1
1 CAPSULE, DELAYED RELEASE ORAL 2 TIMES DAILY
COMMUNITY
End: 2018-10-22 | Stop reason: SDUPTHER

## 2018-10-22 RX ORDER — ZALEPLON 10 MG/1
10 CAPSULE ORAL
COMMUNITY
Start: 2018-10-18 | End: 2020-11-23

## 2018-10-22 RX ORDER — ESTRADIOL 10 UG/1
TABLET VAGINAL
Qty: 24 TABLET | Refills: 3 | Status: SHIPPED | OUTPATIENT
Start: 2018-10-22 | End: 2018-11-27 | Stop reason: SDUPTHER

## 2018-10-22 NOTE — PATIENT INSTRUCTIONS
Calcium 1200-1500mg + 600-1000 IU Vit D daily  Pap with HR HPV q 5 years-due 2021  Annual mammogram DEXA done within the last 2 years years  Colonoscopy-plans to schedule  Monthly BSE  Exercise 150 minutes per week minimum  Kegels 20 times twice daily  Silicone based lubricant with sex

## 2018-10-22 NOTE — PROGRESS NOTES
Assessment/Plan:     Calcium 1200-1500mg + 600-1000 IU Vit D daily  Pap with HR HPV q 5 years-due 2021  Annual mammogram DEXA done within the last 2 years years  Colonoscopy-plans to schedule  Monthly BSE  Exercise 150 minutes per week minimum  Kegels 20 times twice daily  Silicone based lubricant with sex  Negative depression screen  Diagnoses and all orders for this visit:    Encntr for gyn exam (general) (routine) w abnormal findings    Vaginal atrophy  -     YUVAFEM 10 MCG TABS vaginal tablet; Insert one table twice weekly    Encounter for screening mammogram for breast cancer  -     Mammo screening bilateral w 3d & cad; Future    Other orders  -     HUMIRA PEN 40 MG/0 8ML PNKT;   -     cloNIDine (CATAPRES) 0 1 mg tablet;   -     Discontinue: cloNIDine (CATAPRES) 0 1 mg tablet; Take by mouth  -     Discontinue: DULoxetine (CYMBALTA) 60 mg delayed release capsule; Take 1 capsule by mouth 2 (two) times a day  -     DULoxetine (CYMBALTA) 60 mg delayed release capsule;   -     Discontinue: YUVAFEM 10 MCG TABS vaginal tablet;   -     Discontinue: calcium polycarbophil (FIBERCON) 625 mg tablet; Take by mouth  -     folic acid (FOLVITE) 1 mg tablet;   -     Adalimumab (HUMIRA PEN) 40 MG/0 4ML PNKT; Inject under the skin  -     pregabalin (LYRICA) 100 mg capsule; Take 1 capsule by mouth 2 (two) times a day  -     Magnesium 200 MG TABS; Take 1 tablet by mouth daily  -     Discontinue: methotrexate 2 5 mg tablet; Take 8 tablets by mouth once a week  -     methotrexate 2 5 MG tablet;   -     Discontinue: Methylprednisolone 4 MG TBPK;   -     Turmeric 450 MG CAPS; Take 1 capsule by mouth 2 (two) times a day  -     Cholecalciferol (VITAMIN D) 2000 units tablet; Take 2 tablets by mouth daily  -     zaleplon (SONATA) 10 MG capsule;               Subjective:      Patient ID: Brisa Santillan is a 56 y o  female  Brisa Santillan is a 56 y o  female who is here today for her annual visit    States she lost her mom 2 months ago at age 82  Dealing with her moms estate and her fibromyalgia at the same time  Feels improved compared to last year  Follow with rheumatology every 3 months  Total hysterectomy at age 45  Denies vaginal bleeding  Using yuvafem twice weekly  Ramana Zhao is not sexually active with male partner of 31 years  Not acceptable to her, but he has erectile issues and is starting to avoid the topic  Normal pap with negative HR HPV on 10/16 and normal mammo on 10/18  Exercises 2-3 times per week  Depression since last year is stable on meds  Still working full time for [employer]  The following portions of the patient's history were reviewed and updated as appropriate: allergies, current medications, past family history, past medical history, past social history, past surgical history and problem list     Review of Systems   Constitutional: Negative  Negative for activity change, appetite change, chills, diaphoresis, fatigue, fever and unexpected weight change  HENT: Negative for congestion, dental problem, sneezing, sore throat and trouble swallowing  Eyes: Negative for visual disturbance  Respiratory: Negative for chest tightness and shortness of breath  Cardiovascular: Negative for chest pain and leg swelling  Gastrointestinal: Positive for constipation (her norm, plans to discuss with PCP)  Negative for abdominal pain, diarrhea, nausea and vomiting  Genitourinary: Negative for difficulty urinating, dyspareunia, dysuria, frequency, hematuria, pelvic pain, urgency, vaginal bleeding, vaginal discharge and vaginal pain  Musculoskeletal: Negative for back pain and neck pain  Skin: Negative  Allergic/Immunologic: Negative  Neurological: Negative for weakness and headaches  Hematological: Negative for adenopathy  Psychiatric/Behavioral: Negative            Objective:      /62 (BP Location: Right arm, Patient Position: Sitting)   Pulse 68   Ht 5' 4" (1 626 m)   Wt 62 1 kg (137 lb)   LMP  (LMP Unknown)   BMI 23 52 kg/m²          Physical Exam   Constitutional: She is oriented to person, place, and time  She appears well-developed and well-nourished  HENT:   Head: Normocephalic and atraumatic  Eyes: Right eye exhibits no discharge  Left eye exhibits no discharge  Neck: Normal range of motion  Neck supple  Cardiovascular: Normal rate, regular rhythm, normal heart sounds and intact distal pulses  Pulmonary/Chest: Effort normal and breath sounds normal    Abdominal: Soft  Genitourinary: Vagina normal and uterus normal  Rectal exam shows no external hemorrhoid  No breast swelling, tenderness, discharge or bleeding  No labial fusion  There is no rash, tenderness, lesion or injury on the right labia  There is no rash, tenderness, lesion or injury on the left labia  Cervix exhibits no motion tenderness, no discharge and no friability  Right adnexum displays no mass, no tenderness and no fullness  Left adnexum displays no mass, no tenderness and no fullness  No erythema, tenderness or bleeding in the vagina  No foreign body in the vagina  No signs of injury around the vagina  No vaginal discharge found  Genitourinary Comments: Slight vulvovaginal atrophy   Musculoskeletal: Normal range of motion  Lymphadenopathy:     She has no cervical adenopathy  Right: No inguinal adenopathy present  Left: No inguinal adenopathy present  Neurological: She is alert and oriented to person, place, and time  Skin: Skin is warm and dry  Psychiatric: She has a normal mood and affect  Nursing note and vitals reviewed

## 2018-11-20 ENCOUNTER — OFFICE VISIT (OUTPATIENT)
Dept: INTERNAL MEDICINE CLINIC | Facility: CLINIC | Age: 58
End: 2018-11-20
Payer: COMMERCIAL

## 2018-11-20 VITALS
WEIGHT: 138 LBS | HEIGHT: 64 IN | TEMPERATURE: 97.2 F | HEART RATE: 83 BPM | OXYGEN SATURATION: 99 % | DIASTOLIC BLOOD PRESSURE: 60 MMHG | BODY MASS INDEX: 23.56 KG/M2 | SYSTOLIC BLOOD PRESSURE: 110 MMHG | RESPIRATION RATE: 16 BRPM

## 2018-11-20 DIAGNOSIS — Z00.00 ANNUAL PHYSICAL EXAM: Primary | ICD-10-CM

## 2018-11-20 DIAGNOSIS — Z23 NEED FOR PNEUMOCOCCAL VACCINATION: ICD-10-CM

## 2018-11-20 DIAGNOSIS — Z13.6 SCREENING FOR CARDIOVASCULAR CONDITION: ICD-10-CM

## 2018-11-20 DIAGNOSIS — F33.0 MILD EPISODE OF RECURRENT MAJOR DEPRESSIVE DISORDER (HCC): ICD-10-CM

## 2018-11-20 DIAGNOSIS — Z23 NEED FOR INFLUENZA VACCINATION: ICD-10-CM

## 2018-11-20 PROBLEM — F32.9 MDD (MAJOR DEPRESSIVE DISORDER): Status: ACTIVE | Noted: 2017-08-16

## 2018-11-20 PROBLEM — F41.9 ANXIETY: Status: ACTIVE | Noted: 2017-08-16

## 2018-11-20 PROCEDURE — 90472 IMMUNIZATION ADMIN EACH ADD: CPT

## 2018-11-20 PROCEDURE — 99396 PREV VISIT EST AGE 40-64: CPT | Performed by: INTERNAL MEDICINE

## 2018-11-20 PROCEDURE — 90471 IMMUNIZATION ADMIN: CPT

## 2018-11-20 PROCEDURE — 90682 RIV4 VACC RECOMBINANT DNA IM: CPT

## 2018-11-20 PROCEDURE — 90732 PPSV23 VACC 2 YRS+ SUBQ/IM: CPT

## 2018-11-20 NOTE — PROGRESS NOTES
ADULT ANNUAL PHYSICAL  [facility] -  [facility]    NAME: Jakob Black  AGE: 56 y o  SEX: female  : 1960     DATE: 2018     Assessment and Plan:     Diagnoses and all orders for this visit:    Annual physical exam    Screening for cardiovascular condition  -     Lipid panel; Future    Mild episode of recurrent major depressive disorder ([address] )  -     Ambulatory referral to Psychiatry; Future    Need for influenza vaccination  -     influenza vaccine, [id number], quadrivalent, recombinant, PF, 0 5 mL, for patients 18 yr+ (FLUBLOK)    Need for pneumococcal vaccination  -     PNEUMOCOCCAL POLYSACCHARIDE VACCINE 23-VALENT =>3YO SQ IM      Health maintenance and preventative care screenings were discussed with patient today  Appropriate education was printed on patient's after visit summary  · Discussed risks/benefits of screening for breast cancer, colorectal cancer, hepatitis c and high cholesterol  Patient agrees to screening for colorectal cancer and high cholesterol  Plans to do colonoscopy next year, had hep c screening prior to starting humira  She is up to date with mammo 10/2018  · Immunizations were reviewed: patient agrees to influenza vaccine and pneumococcal vaccine  Counseling:  Dental Health: discussed importance of regular tooth brushing, flossing, and dental visits  · Alcohol/drug use: discussed moderation in alcohol intake and avoidance of illicit drug use  Return in about 1 year (around 2019) for Annual physical      Chief Complaint:     Chief Complaint   Patient presents with    Annual Exam      History of Present Illness:     Adult Annual Physical   Patient here for a comprehensive physical exam   She has osteoporosis, RA, MDD, fibromyalgia  The patient reports no problems but requests to see new psychiatrist   She is having difficulty at work    She is grieving the loss of her mother three months ago and has been working on her home to resell it  Diet and Physical Activity  · Diet/Nutrition: regular diet  · Weight concerns: none, patient's BMI is between 18 5-24 9  · Exercise: was doing aquatic therapy and is staying physically active  Depression Screening  PHQ-9 Depression Screening    PHQ-9:    Frequency of the following problems over the past two weeks:       Little interest or pleasure in doing things:  1 - several days  Feeling down, depressed, or hopeless:  1 - several days  Trouble falling or staying asleep, or sleeping too much:  2 - more than half the days  Feeling tired or having little energy:  3 - nearly every day  Poor appetite or overeatin - not at all  Feeling bad about yourself - or that you are a failure or have let yourself or your family down:  2 - more than half the days  Trouble concentrating on things, such as reading the newspaper or watching television:  0 - not at all  Moving or speaking so slowly that other people could have noticed  Or the opposite - being so fidgety or restless that you have been moving around a lot more than usual:  0 - not at all  Thoughts that you would be better off dead, or of hurting yourself in some way:  0 - not at all  PHQ-2 Score:  2  PHQ-9 Score:  9       General Health  · Sleep: sleeps well  Takes Sonata  · Hearing: normal - bilateral   · Vision: most recent eye exam <1 year ago and wears glasses  · Dental: regular dental visits  /GYN Health  · Patient is: surgical menopause  · Last menstrual period: N/A  · Contraceptive method: N/A  · Menopausal symptoms: vaginal dryness (on uvafem)  Review of Systems:     Review of Systems   Constitutional:        Gradual weight loss   HENT: Positive for postnasal drip and rhinorrhea  Respiratory: Negative  Cardiovascular: Negative  Gastrointestinal: Negative  Genitourinary: Negative  Musculoskeletal: Positive for arthralgias and myalgias  Negative for joint swelling     Neurological: Positive for numbness and headaches  Psychiatric/Behavioral: Positive for decreased concentration, dysphoric mood and sleep disturbance  The patient is nervous/anxious         Past Medical History:     Past Medical History:   Diagnosis Date    Endometriosis       Past Surgical History:     Past Surgical History:   Procedure Laterality Date    TONSILLECTOMY      TOOTH EXTRACTION      TOTAL ABDOMINAL HYSTERECTOMY  2005    at age 45      Social History:     Social History     Social History    Marital status: /Civil Union     Spouse name: N/A    Number of children: 3    Years of education: N/A     Occupational History    works for [employer] to [address] [place] [address] full-time employment      Social History Main Topics    Smoking status: Never Smoker    Smokeless tobacco: Never Used    Alcohol use Yes      Comment: socially     Drug use: No    Sexual activity: Not Currently     Partners: Male     Birth control/ protection: None     Other Topics Concern    None     Social History Narrative    Daily caffeine consumption, 2-3 serving a day          Family History:     Family History   Problem Relation Age of Onset    Hypertension Mother     Hypothyroidism Mother     Osteoporosis Mother     Pulmonary embolism Mother     Other Father         cardiac disorder    Lymphoma Father         chronic lymphoid leukemia    Emphysema Brother         lung    Osteoporosis Maternal Grandmother     Thyroid disease Maternal Grandmother     Transient ischemic attack Maternal Grandmother     Thyroid cancer Maternal Aunt     No Known Problems Son     No Known Problems Son     Allergies Son       Current Medications:     Current Outpatient Prescriptions   Medication Sig Dispense Refill    Adalimumab (HUMIRA PEN) 40 MG/0 4ML PNKT Inject under the skin      Cholecalciferol (VITAMIN D) 2000 units tablet Take 2 tablets by mouth daily      cloNIDine (CATAPRES) 0 1 mg tablet       DULoxetine (CYMBALTA) 60 mg delayed release capsule       folic acid (FOLVITE) 1 mg tablet       HUMIRA PEN 40 MG/0 8ML PNKT       Magnesium 200 MG TABS Take 1 tablet by mouth daily      methotrexate 2 5 MG tablet       pregabalin (LYRICA) 100 mg capsule Take 1 capsule by mouth 2 (two) times a day      Turmeric 450 MG CAPS Take 1 capsule by mouth 2 (two) times a day      YUVAFEM 10 MCG TABS vaginal tablet Insert one table twice weekly 24 tablet 3    zaleplon (SONATA) 10 MG capsule        No current facility-administered medications for this visit  Allergies:     No Known Allergies   Objective:     /60 (BP Location: Left arm, Patient Position: Sitting)   Pulse 83   Temp (!) 97 2 °F (36 2 °C)   Resp 16   Ht 5' 4" (1 626 m)   Wt 62 6 kg (138 lb)   LMP  (LMP Unknown)   SpO2 99%   BMI 23 69 kg/m²   Physical Exam   Constitutional: She is oriented to person, place, and time  She appears well-developed and well-nourished  HENT:   Right Ear: External ear normal    Left Ear: External ear normal    Nose: Nose normal    Mouth/Throat: Oropharynx is clear and moist  No oropharyngeal exudate  Eyes: Conjunctivae, EOM and lids are normal    Wears glasses   Neck: Neck supple  No thyromegaly present  Cardiovascular: Normal rate, regular rhythm, normal heart sounds and intact distal pulses  Pulmonary/Chest: Effort normal and breath sounds normal  No respiratory distress  She has no wheezes  Breast exam deferred by patient   Abdominal: Soft  Bowel sounds are normal  She exhibits no distension  There is no tenderness  Lymphadenopathy:     She has no cervical adenopathy  Neurological: She is alert and oriented to person, place, and time  Psychiatric: She has a normal mood and affect  Her behavior is normal    Vitals reviewed         Health Maintenance:     Health Maintenance Topics with due status: Not Due       Topic Last Completion Date    DTaP,Tdap,and Td Vaccines 07/23/2015    PAP SMEAR 10/07/2016    MAMMOGRAM 10/15/2018     Health Maintenance Topics with due status: Overdue       Topic Date Due    Hepatitis C Screening 1960    CRC Screening: Colonoscopy 1960    INFLUENZA VACCINE 2018     Immunization History   Administered Date(s) Administered    Influenza 2017, 2017    Influenza TIV (IM) 2017    Influenza, recombinant, quadrivalent,injectable, preservative free 2018    Pneumococcal Polysaccharide PPV23 2018    Tdap 2015     PHQ-9 Depression Screening    PHQ-9:    Frequency of the following problems over the past two weeks:       Little interest or pleasure in doing things:  1 - several days  Feeling down, depressed, or hopeless:  1 - several days  Trouble falling or staying asleep, or sleeping too much:  2 - more than half the days  Feeling tired or having little energy:  3 - nearly every day  Poor appetite or overeatin - not at all  Feeling bad about yourself - or that you are a failure or have let yourself or your family down:  2 - more than half the days  Trouble concentrating on things, such as reading the newspaper or watching television:  0 - not at all  Moving or speaking so slowly that other people could have noticed   Or the opposite - being so fidgety or restless that you have been moving around a lot more than usual:  0 - not at all  Thoughts that you would be better off dead, or of hurting yourself in some way:  0 - not at all  PHQ-2 Score:  2  PHQ-9 Score:  Tracey [address] [place] [address]

## 2018-11-20 NOTE — PATIENT INSTRUCTIONS
Wellness Visit for Adults   AMBULATORY CARE:   A wellness visit  is when you see your healthcare provider to get screened for health problems  You can also get advice on how to stay healthy  Write down your questions so you remember to ask them  Ask your healthcare provider how often you should have a wellness visit  What happens at a wellness visit:  Your healthcare provider will ask about your health, and your family history of health problems  This includes high blood pressure, heart disease, and cancer  He or she will ask if you have symptoms that concern you, if you smoke, and about your mood  You may also be asked about your intake of medicines, supplements, food, and alcohol  Any of the following may be done:  · Your weight  will be checked  Your height may also be checked so your body mass index (BMI) can be calculated  Your BMI shows if you are at a healthy weight  · Your blood pressure  and heart rate will be checked  Your temperature may also be checked  · Blood and urine tests  may be done  Blood tests may be done to check your cholesterol levels  Abnormal cholesterol levels increase your risk for heart disease and stroke  You may also need a blood or urine test to check for diabetes if you are at increased risk  Urine tests may be done to look for signs of an infection or kidney disease  · A physical exam  includes checking your heartbeat and lungs with a stethoscope  Your healthcare provider may also check your skin to look for sun damage  · Screening tests  may be recommended  A screening test is done to check for diseases that may not cause symptoms  The screening tests you may need depend on your age, gender, family history, and lifestyle habits  For example, colorectal screening may be recommended if you are 48years old or older  Screening tests you need if you are a woman:   · A Pap smear  is used to screen for cervical cancer   Pap smears are usually done every 3 to 5 years depending on your age  You may need them more often if you have had abnormal Pap smear test results in the past  Ask your healthcare provider how often you should have a Pap smear  · A mammogram  is an x-ray of your breasts to screen for breast cancer  Experts recommend mammograms every 2 years starting at age 48 years  You may need a mammogram at age 52 years or younger if you have an increased risk for breast cancer  Talk to your healthcare provider about when you should start having mammograms and how often you need them  Vaccines you may need:   · Get an influenza vaccine  every year  The influenza vaccine protects you from the flu  Several types of viruses cause the flu  The viruses change over time, so new vaccines are made each year  · Get a tetanus-diphtheria (Td) booster vaccine  every 10 years  This vaccine protects you against tetanus and diphtheria  Tetanus is a severe infection that may cause painful muscle spasms and lockjaw  Diphtheria is a severe bacterial infection that causes a thick covering in the back of your mouth and throat  · Get a human papillomavirus (HPV) vaccine  if you are female and aged 23 to 32 or male 23 to 24 and never received it  This vaccine protects you from HPV infection  HPV is the most common infection spread by sexual contact  HPV may also cause vaginal, penile, and anal cancers  · Get a pneumococcal vaccine  if you are aged 72 years or older  The pneumococcal vaccine is an injection given to protect you from pneumococcal disease  Pneumococcal disease is an infection caused by pneumococcal bacteria  The infection may cause pneumonia, meningitis, or an ear infection  · Get a shingles vaccine  if you are aged 61 or older, even if you have had shingles before  The shingles vaccine is an injection to protect you from the varicella-zoster virus  This is the same virus that causes chickenpox   Shingles is a painful rash that develops in people who had chickenpox or have been exposed to the virus  How to eat healthy:  My Plate is a model for planning healthy meals  It shows the types and amounts of foods that should go on your plate  Fruits and vegetables make up about half of your plate, and grains and protein make up the other half  A serving of dairy is included on the side of your plate  The amount of calories and serving sizes you need depends on your age, gender, weight, and height  Examples of healthy foods are listed below:  · Eat a variety of vegetables  such as dark green, red, and orange vegetables  You can also include canned vegetables low in sodium (salt) and frozen vegetables without added butter or sauces  · Eat a variety of fresh fruits , canned fruit in 100% juice, frozen fruit, and dried fruit  · Include whole grains  At least half of the grains you eat should be whole grains  Examples include whole-wheat bread, wheat pasta, brown rice, and whole-grain cereals such as oatmeal     · Eat a variety of protein foods such as seafood (fish and shellfish), lean meat, and poultry without skin (turkey and chicken)  Examples of lean meats include pork leg, shoulder, or tenderloin, and beef round, sirloin, tenderloin, and extra lean ground beef  Other protein foods include eggs and egg substitutes, beans, peas, soy products, nuts, and seeds  · Choose low-fat dairy products such as skim or 1% milk or low-fat yogurt, cheese, and cottage cheese  · Limit unhealthy fats  such as butter, hard margarine, and shortening  Exercise:  Exercise at least 30 minutes per day on most days of the week  Some examples of exercise include walking, biking, dancing, and swimming  You can also fit in more physical activity by taking the stairs instead of the elevator or parking farther away from stores  Include muscle strengthening activities 2 days each week  Regular exercise provides many health benefits   It helps you manage your weight, and decreases your risk for type 2 diabetes, heart disease, stroke, and high blood pressure  Exercise can also help improve your mood  Ask your healthcare provider about the best exercise plan for you  General health and safety guidelines:   · Do not smoke  Nicotine and other chemicals in cigarettes and cigars can cause lung damage  Ask your healthcare provider for information if you currently smoke and need help to quit  E-cigarettes or smokeless tobacco still contain nicotine  Talk to your healthcare provider before you use these products  · Limit alcohol  A drink of alcohol is 12 ounces of beer, 5 ounces of wine, or 1½ ounces of liquor  · Lose weight, if needed  Being overweight increases your risk of certain health conditions  These include heart disease, high blood pressure, type 2 diabetes, and certain types of cancer  · Protect your skin  Do not sunbathe or use tanning beds  Use sunscreen with a SPF 15 or higher  Apply sunscreen at least 15 minutes before you go outside  Reapply sunscreen every 2 hours  Wear protective clothing, hats, and sunglasses when you are outside  · Drive safely  Always wear your seatbelt  Make sure everyone in your car wears a seatbelt  A seatbelt can save your life if you are in an accident  Do not use your cell phone when you are driving  This could distract you and cause an accident  Pull over if you need to make a call or send a text message  · Practice safe sex  Use latex condoms if are sexually active and have more than one partner  Your healthcare provider may recommend screening tests for sexually transmitted infections (STIs)  · Wear helmets, lifejackets, and protective gear  Always wear a helmet when you ride a bike or motorcycle, go skiing, or play sports that could cause a head injury  Wear protective equipment when you play sports  Wear a lifejacket when you are on a boat or doing water sports    © 2017 [employer] Information is for End User's use only and may not be sold, redistributed or otherwise used for commercial purposes  All illustrations and images included in CareNotes® are the copyrighted property of [employer]  or [employer]  The above information is an  only  It is not intended as medical advice for individual conditions or treatments  Talk to your doctor, nurse or pharmacist before following any medical regimen to see if it is safe and effective for you  Cholesterol and Your Health   AMBULATORY CARE:   Cholesterol  is a waxy, fat-like substance  Cholesterol is made by your body, but also comes from certain foods you eat  Your body uses cholesterol to make hormones and new cells  Your body also uses cholesterol to protect nerves  Cholesterol comes from foods such as meat and dairy products  Your total cholesterol level is made up by LDL cholesterol, HDL cholesterol, and triglycerides:  · LDL cholesterol  is called bad cholesterol  because it forms plaque in your arteries  As plaque builds up, your arteries become narrow, and less blood flows through  When plaque decreases blood flow to your heart, you may have chest pain  If plaque completely blocks an artery that bring blood to your heart, you may have a heart attack  Plaque can break off and form blood clots  Blood clots may block arteries in your brain and cause a stroke  · HDL cholesterol  is called good cholesterol  because it helps remove LDL cholesterol from your arteries  It does this by attaching to LDL cholesterol and carrying it to your liver  Your liver breaks down LDL cholesterol so your body can get rid of it  High levels of HDL cholesterol can help prevent a heart attack and stroke  Low levels of HDL cholesterol can increase your risk for heart disease, heart attack, and stroke  · Triglycerides  are a type of fat that store energy from foods you eat  High levels of triglycerides also cause plaque buildup   This can increase your risk for a heart attack or stroke  If your triglyceride level is high, your LDL cholesterol level may also be high  How food affects your cholesterol levels:   · Unhealthy fats  increase LDL cholesterol and triglyceride levels in your blood  They are found in foods high in cholesterol, saturated fat, and trans fat:     ¨ Cholesterol  is found in eggs, dairy, and meat  ¨ Saturated fat  is found in butter, cheese, ice cream, whole milk, and coconut oil  Saturated fat is also found in meat, such as sausage, hot dogs, and bologna  ¨ Trans fat  is found in liquid oils and is used in fried and baked foods  Foods that contain trans fats include chips, crackers, muffins, sweet rolls, microwave popcorn, and cookies  · Healthy fats,  also called unsaturated fats, help lower LDL cholesterol and triglyceride levels  Healthy fats include the following:     ¨ Monounsaturated fats  are found in foods such as olive oil, canola oil, avocado, nuts, and olives  ¨ Polyunsaturated fats,  such as omega 3 fats, are found in fish, such as salmon, trout, and tuna  They can also be found in plant foods such as flaxseed, walnuts, and soybeans  Other things that affect your cholesterol levels:   · Smoking cigarettes    · Being overweight or obese     · Drinking large amounts of alcohol    · Not enough exercise or no exercise    · Certain genes passed from your parents to you  What you need to know about having your cholesterol levels checked: Adults 21to 39years of age should have their cholesterol levels checked every 4 to 6 years  Adults 45 years and older should have their cholesterol checked every 1 to 2 years  You may need your cholesterol checked more often, or at a younger age, if you have risk factors for heart disease  You may also need to have your cholesterol checked more often if you have other health conditions, such as diabetes  Blood tests are used to check cholesterol levels   Blood tests measure your levels of triglycerides, LDL cholesterol, and HDL cholesterol  Cholesterol level goals: Your cholesterol level goal may depend on your risk for heart disease  It may also depend on your age and other health conditions  Ask your healthcare provider if the following goals are right for you:  · Your total cholesterol level  should be less than 200 mg/dL  This number may also depend on your HDL and LDL cholesterol goals  · Your LDL cholesterol level  should be less than 130 mg/dL  · Your HDL cholesterol level  should be 60 mg/dL or higher  · Your triglyceride level  should be less than 150 mg/dL  Treatment for high cholesterol:  Treatment for high cholesterol will also decrease your risk of heart disease, heart attack, and stroke  Treatment may include any of the following:  · Medicines  may be given to lower your LDL cholesterol, triglyceride levels, or total cholesterol level  You may need medicines to lower your cholesterol if any of the following is true:     ¨ You have a history of stroke, TIA, unstable angina, or a heart attack    ¨ Your LDL cholesterol level is 190 mg/dL or higher    ¨ You are age 36to 76years of age, have diabetes, and your LDL cholesterol is 70 mg/dL or higher    ¨ You are age 36to 76years of age, have risk factors for heart disease, and your LDL cholesterol is 70 mg/dL or higher    · Lifestyle changes  include changes to your diet, exercise, weight loss, and quitting smoking  It also includes decreasing the amount of alcohol you drink  · Supplements  include fish oil, red yeast rice, and garlic  Fish oil may help lower your triglyceride and LDL cholesterol levels  It may also increase your HDL cholesterol level  Red yeast rice may help decrease your total cholesterol level and LDL cholesterol level  Garlic may help lower your total cholesterol level  Do not take these supplements without talking to your healthcare provider     Nutrition to help lower your cholesterol levels:  A registered dietitian can help you create a healthy eating plan  Read food labels and choose foods low in saturated fat, trans fats, and cholesterol  · Decrease the total amount of fat you eat  Choose lean meats, fat-free or 1% fat milk, and low-fat dairy products, such as yogurt and cheese  Try to limit or avoid red meats  Limit or do not eat fried foods or baked goods such as cookies  · Replace unhealthy fats with healthy fats  Cook foods in olive oil or canola oil  Choose soft margarines that are low in saturated fat and trans fat  Seeds, nuts, and avocados are other examples of healthy fats  · Eat foods with omega-3 fats  Examples include salmon, tuna, mackerel, walnuts, and flaxseed  Eat fish 2 times per week  Children and pregnant women should not eat fish that have high levels of mercury, such as shark, swordfish, and manan mackerel  · Increase the amount of plant-based foods you eat  Plant-based foods are low in cholesterol and fat  Eating more of these foods may help lower your cholesterol and help you lose weight  Examples of plant-based foods includes fruits, vegetables, legumes, and whole grains  Replace milk that contains dairy with almond, soy, or coconut milk  Eat beans and foods with soy for protein instead of meat  Ask your healthcare provider or dietitian for more information on plant-based foods  · Increase the amount of fiber you eat  High-fiber foods can help lower your LDL cholesterol  You should eat between 20 and 30 grams of fiber each day  Eat at least 5 servings of fruits and vegetables each day  Other examples of high-fiber foods include whole-grain or whole-wheat breads, pastas, or cereals, and brown rice  Eat 3 ounces of whole-grain foods each day  Increase fiber slowly  You may have abdominal discomfort, bloating, and gas if you add fiber to your diet too quickly  Lifestyle changes you can make to help lower your cholesterol levels:   · Maintain a healthy weight  Ask your healthcare provider how much you should weigh  Ask him or her to help you create a weight loss plan if you are overweight  Weight loss can decrease your total cholesterol and triglyceride levels  · Exercise regularly  Exercise can help lower your total cholesterol level and maintain a healthy weight  Exercise can also help increase your HDL cholesterol level  Work with your healthcare provider to create an exercise program that is right for you  Get at least 30 minutes of moderate exercise most days of the week  Examples of exercise include brisk walking, swimming, or biking  · Do not smoke  Nicotine and other chemicals in cigarettes and cigars can damage your lungs, heart, and blood vessels  They can also raise your triglyceride levels  Ask your healthcare provider for information if you currently smoke and need help to quit  E-cigarettes or smokeless tobacco still contain nicotine  Talk to your healthcare provider before you use these products  · Limit or do not drink alcohol  Alcohol can increase your triglyceride levels  Ask your healthcare provider if it is safe for you to drink alcohol  Also ask how much is safe for you to drink each day  © 2017 [facility] Information is for End User's use only and may not be sold, redistributed or otherwise used for commercial purposes  All illustrations and images included in CareNotes® are the copyrighted property of [employer]  or [employer]  The above information is an  only  It is not intended as medical advice for individual conditions or treatments  Talk to your doctor, nurse or pharmacist before following any medical regimen to see if it is safe and effective for you

## 2018-11-20 NOTE — ASSESSMENT & PLAN NOTE
Mild in severity on duloxetine and clonidine  She is followed by Dr Karma Narayanan but plans to switch  She is not currently seeing a therapist, will refer to Eben Sullivanon  Has had decreased concentration, affecting her work, since the death of her mother three months ago

## 2018-11-27 ENCOUNTER — TELEPHONE (OUTPATIENT)
Dept: GYNECOLOGY | Facility: CLINIC | Age: 58
End: 2018-11-27

## 2018-11-27 DIAGNOSIS — N95.2 VAGINAL ATROPHY: ICD-10-CM

## 2018-11-27 RX ORDER — ESTRADIOL 10 UG/1
TABLET VAGINAL
Qty: 24 TABLET | Refills: 2 | Status: SHIPPED | OUTPATIENT
Start: 2018-11-27 | End: 2020-03-16

## 2018-12-20 ENCOUNTER — OFFICE VISIT (OUTPATIENT)
Dept: BEHAVIORAL/MENTAL HEALTH CLINIC | Facility: CLINIC | Age: 58
End: 2018-12-20
Payer: COMMERCIAL

## 2018-12-20 DIAGNOSIS — F33.0 MILD EPISODE OF RECURRENT MAJOR DEPRESSIVE DISORDER (HCC): ICD-10-CM

## 2018-12-20 PROCEDURE — 90834 PSYTX W PT 45 MINUTES: CPT | Performed by: SOCIAL WORKER

## 2018-12-20 NOTE — PATIENT INSTRUCTIONS
Laisha Morrow utilizes session to ventilate about and process loss of mother, subsequent stress with estate and its effect on her- normalization and support provided  Reviewed coping strategies for depression  Reinforced need to push self to be active with social outlets and interests to further generate energy and interest and guard against social isolation  Provided my contact information and offered additional sessions on a PRN basis

## 2018-12-20 NOTE — PSYCH
Assessment/Plan:  Manage depression     Diagnoses and all orders for this visit:    Mild episode of recurrent major depressive disorder [facility])  -     Ambulatory referral to Psychiatry          Subjective: Luis Lemon denies any acute depressive symptoms  Hs reported some reduction in her symptoms due to onset of holidays and due to support of family and Religious community     Patient ID: Jonathan Lemus is a 56 y o  female  Luis Lemon continues to report mild symptoms of depression that she feels have been lessening  Review of Systems   Psychiatric/Behavioral: Positive for dysphoric mood  Objective: Edna Rizo presents as pleasant, verbal, cooperative and well oriented  Has experienced some depressive symptoms since the passing of her mother several months ago abruptly  The stress of this and settling her estate has been primary stressor  Physical Exam   Psychiatric: Her speech is normal and behavior is normal  Judgment and thought content normal  Cognition and memory are normal  She exhibits a depressed mood  No SI  Very invested in her family, [place]leigh and Religious  Has extensive support system

## 2019-10-05 ENCOUNTER — APPOINTMENT (OUTPATIENT)
Dept: LAB | Age: 59
End: 2019-10-05
Payer: COMMERCIAL

## 2019-10-05 DIAGNOSIS — Z13.6 SCREENING FOR CARDIOVASCULAR CONDITION: ICD-10-CM

## 2019-10-05 LAB
CHOLEST SERPL-MCNC: 206 MG/DL (ref 50–200)
HDLC SERPL-MCNC: 69 MG/DL (ref 40–60)
LDLC SERPL CALC-MCNC: 121 MG/DL (ref 0–100)
NONHDLC SERPL-MCNC: 137 MG/DL
TRIGL SERPL-MCNC: 82 MG/DL

## 2019-10-05 PROCEDURE — 80061 LIPID PANEL: CPT

## 2019-10-05 PROCEDURE — 36415 COLL VENOUS BLD VENIPUNCTURE: CPT

## 2019-10-17 ENCOUNTER — HOSPITAL ENCOUNTER (OUTPATIENT)
Dept: RADIOLOGY | Age: 59
Discharge: HOME | End: 2019-10-17
Payer: COMMERCIAL

## 2019-10-17 VITALS — WEIGHT: 138 LBS | BODY MASS INDEX: 23.56 KG/M2 | HEIGHT: 64 IN

## 2019-10-17 DIAGNOSIS — Z12.31 ENCOUNTER FOR SCREENING MAMMOGRAM FOR BREAST CANCER: ICD-10-CM

## 2019-10-17 PROCEDURE — 77067 SCR MAMMO BI INCL CAD: CPT

## 2019-10-17 PROCEDURE — 77063 BREAST TOMOSYNTHESIS BI: CPT

## 2019-10-18 DIAGNOSIS — R92.8 ABNORMAL MAMMOGRAM OF LEFT BREAST: Primary | ICD-10-CM

## 2019-10-18 NOTE — RESULT ENCOUNTER NOTE
Radiology requesting diagnostic studies of the left breast including additional views with mammography and an ultrasound

## 2019-10-22 NOTE — PROGRESS NOTES
Assessment/Plan:     Calcium 1200-1500mg + 600-1000 IU Vit D daily  Exercise 150 minutes per week minimum including weight bearing exercises  Pap with high risk HPV Q 5 years-due 2021  Annual mammogram and monthly breast self exam recommended  Colonoscopy-scheduled for next month  Kegels 20 times twice daily  Silicone based lubricant with sex  Vaginal moisturizers twice weekly as needed  Declines refill on yuvafem currently and will call when needed  Complete diagnostic mammogram and ultrasound  Negative depression screen  Diagnoses and all orders for this visit:    Encntr for gyn exam (general) (routine) w abnormal findings    Vaginal atrophy    Encounter for screening mammogram for breast cancer  -     Mammo screening bilateral w 3d & cad; Future              Subjective:      Patient ID: Lavada Lesches is a 66 y o  female  Lavada Lesches is a 66 y o  female who is here today for her annual visit  No health concerns  Started different job at [employer] and pleased with the change  No longer works from home though  Gama Simmons is sexually active with male partner/ of 32 years  He continues to have erectile issues and is using viagra  Hx- Abd hysterectomy-suprcervical and oopherectomy for pelvic pain/endometriosis  No vaginal bleeding  Using yuvafem twice weekly  She has 2 grandsons (2&1) but they live in [place]  Normal pap 3/16  Asymmetry noted in left breast on  Mammogram; Right WNL 10/19  She is scheduled for diagnostic mammo and US this week  Exercising 3-5 times per week  The following portions of the patient's history were reviewed and updated as appropriate: allergies, current medications, past family history, past medical history, past social history, past surgical history and problem list     Review of Systems   Constitutional: Negative  Negative for activity change, appetite change, chills, diaphoresis, fatigue, fever and unexpected weight change     HENT: Negative for congestion, dental problem, sneezing, sore throat and trouble swallowing  Eyes: Negative for visual disturbance  Respiratory: Negative for chest tightness and shortness of breath  Cardiovascular: Negative for chest pain and leg swelling  Gastrointestinal: Negative for abdominal pain, constipation, diarrhea, nausea and vomiting  Genitourinary: Negative for difficulty urinating, dyspareunia, dysuria, frequency, hematuria, menstrual problem, pelvic pain, urgency, vaginal bleeding, vaginal discharge and vaginal pain  Musculoskeletal: Negative for back pain and neck pain  Skin: Negative  Allergic/Immunologic: Negative  Neurological: Negative for weakness and headaches  Hematological: Negative for adenopathy  Psychiatric/Behavioral: Negative  Objective:      /64 (BP Location: Left arm, Patient Position: Sitting, Cuff Size: Standard)   Ht 5' 4" (1 626 m)   Wt 64 kg (141 lb 3 2 oz)   LMP  (LMP Unknown)   BMI 24 24 kg/m²          Physical Exam   Constitutional: She is oriented to person, place, and time  Vital signs are normal  She appears well-developed and well-nourished  HENT:   Head: Normocephalic and atraumatic  Eyes: Right eye exhibits no discharge  Left eye exhibits no discharge  Neck: Trachea normal and normal range of motion  Neck supple  No thyromegaly present  Cardiovascular: Normal rate, regular rhythm, normal heart sounds and intact distal pulses  Pulmonary/Chest: Effort normal and breath sounds normal  Right breast exhibits no inverted nipple, no mass, no nipple discharge, no skin change and no tenderness  Left breast exhibits no inverted nipple, no mass, no nipple discharge, no skin change and no tenderness  No breast tenderness, discharge or bleeding  Breasts are symmetrical    Dense breast tissue   Abdominal: Soft  Normal appearance  Genitourinary: Vagina normal  Rectal exam shows no external hemorrhoid  No breast tenderness, discharge or bleeding  Pelvic exam was performed with patient supine  No labial fusion  There is no rash, tenderness, lesion or injury on the right labia  There is no rash, tenderness, lesion or injury on the left labia  Cervix exhibits no motion tenderness, no discharge and no friability  Right adnexum displays no mass, no tenderness and no fullness  Left adnexum displays no mass, no tenderness and no fullness  No erythema, tenderness or bleeding in the vagina  No foreign body in the vagina  No signs of injury around the vagina  No vaginal discharge found  Genitourinary Comments: Vulvovaginal atrophy; adequate hydration  Uterus surgically absent  Tone 3/5   Musculoskeletal: Normal range of motion  Lymphadenopathy:        Head (right side): No submental, no submandibular and no tonsillar adenopathy present  Head (left side): No submental, no submandibular and no tonsillar adenopathy present  She has no cervical adenopathy  She has no axillary adenopathy  No inguinal adenopathy noted on the right or left side  Right: No inguinal adenopathy present  Left: No inguinal adenopathy present  Neurological: She is alert and oriented to person, place, and time  Skin: Skin is warm and dry  Psychiatric: She has a normal mood and affect  Nursing note and vitals reviewed

## 2019-10-23 ENCOUNTER — ANNUAL EXAM (OUTPATIENT)
Dept: GYNECOLOGY | Facility: CLINIC | Age: 59
End: 2019-10-23
Payer: COMMERCIAL

## 2019-10-23 VITALS
DIASTOLIC BLOOD PRESSURE: 64 MMHG | SYSTOLIC BLOOD PRESSURE: 102 MMHG | WEIGHT: 141.2 LBS | BODY MASS INDEX: 24.11 KG/M2 | HEIGHT: 64 IN

## 2019-10-23 DIAGNOSIS — N95.2 VAGINAL ATROPHY: ICD-10-CM

## 2019-10-23 DIAGNOSIS — Z01.411 ENCNTR FOR GYN EXAM (GENERAL) (ROUTINE) W ABNORMAL FINDINGS: Primary | ICD-10-CM

## 2019-10-23 DIAGNOSIS — Z12.31 ENCOUNTER FOR SCREENING MAMMOGRAM FOR BREAST CANCER: ICD-10-CM

## 2019-10-23 PROCEDURE — S0612 ANNUAL GYNECOLOGICAL EXAMINA: HCPCS | Performed by: NURSE PRACTITIONER

## 2019-10-23 NOTE — PATIENT INSTRUCTIONS
Calcium 1200-1500mg + 600-1000 IU Vit D daily  Exercise 150 minutes per week minimum including weight bearing exercises  Pap with high risk HPV Q 5 years-due 2021  Annual mammogram and monthly breast self exam recommended  Colonoscopy-scheduled for next month  Kegels 20 times twice daily  Silicone based lubricant with sex  Vaginal moisturizers twice weekly as needed  Declines refill on yuvafem currently and will call when needed  Complete diagnostic mammogram and ultrasound

## 2019-10-25 ENCOUNTER — HOSPITAL ENCOUNTER (OUTPATIENT)
Dept: MAMMOGRAPHY | Facility: CLINIC | Age: 59
Discharge: HOME | End: 2019-10-25
Payer: COMMERCIAL

## 2019-10-25 ENCOUNTER — HOSPITAL ENCOUNTER (OUTPATIENT)
Dept: ULTRASOUND IMAGING | Facility: CLINIC | Age: 59
Discharge: HOME | End: 2019-10-25
Payer: COMMERCIAL

## 2019-10-25 VITALS — BODY MASS INDEX: 24.07 KG/M2 | HEIGHT: 64 IN | WEIGHT: 141 LBS

## 2019-10-25 DIAGNOSIS — R92.8 ABNORMAL MAMMOGRAM: ICD-10-CM

## 2019-10-25 PROCEDURE — 77065 DX MAMMO INCL CAD UNI: CPT

## 2019-10-25 PROCEDURE — G0279 TOMOSYNTHESIS, MAMMO: HCPCS

## 2019-11-21 ENCOUNTER — OFFICE VISIT (OUTPATIENT)
Dept: INTERNAL MEDICINE CLINIC | Facility: CLINIC | Age: 59
End: 2019-11-21
Payer: COMMERCIAL

## 2019-11-21 VITALS
HEART RATE: 74 BPM | TEMPERATURE: 98.7 F | DIASTOLIC BLOOD PRESSURE: 72 MMHG | HEIGHT: 64 IN | SYSTOLIC BLOOD PRESSURE: 112 MMHG | OXYGEN SATURATION: 99 % | RESPIRATION RATE: 16 BRPM | BODY MASS INDEX: 24.07 KG/M2 | WEIGHT: 141 LBS

## 2019-11-21 DIAGNOSIS — Z13.6 SCREENING FOR CARDIOVASCULAR CONDITION: ICD-10-CM

## 2019-11-21 DIAGNOSIS — Z11.59 NEED FOR HEPATITIS C SCREENING TEST: ICD-10-CM

## 2019-11-21 DIAGNOSIS — Z00.00 ANNUAL PHYSICAL EXAM: Primary | ICD-10-CM

## 2019-11-21 DIAGNOSIS — Z11.4 SCREENING FOR HIV (HUMAN IMMUNODEFICIENCY VIRUS): ICD-10-CM

## 2019-11-21 PROCEDURE — 99396 PREV VISIT EST AGE 40-64: CPT | Performed by: INTERNAL MEDICINE

## 2019-11-21 NOTE — PROGRESS NOTES
ADULT ANNUAL [address] INTERNAL MEDICINE    NAME: Ricco Morfin  AGE: 66 y o  SEX: female  : 1960     DATE: 2019     Assessment and Plan:     Problem List Items Addressed This Visit     None      Visit Diagnoses     Annual physical exam    -  Primary    Screening for cardiovascular condition        Relevant Orders    Lipid panel    Need for hepatitis C screening test        Relevant Orders    Hepatitis C antibody    Screening for HIV (human immunodeficiency virus)        Relevant Orders    Human Immunodeficiency Virus 1/2 Antigen / Antibody ( Fourth Generation) with Reflex Testing        Immunizations and preventive care screenings were discussed with patient today  Appropriate education was printed on patient's after visit summary  Counseling:  Dental Health: discussed importance of regular tooth brushing, flossing, and dental visits  · Exercise: the importance of regular exercise/physical activity was discussed  Recommend exercise 3-5 times per week for at least 30 minutes  Return in about 1 year (around 2020) for Annual physical      Chief Complaint:     Chief Complaint   Patient presents with    Physical Exam      History of Present Illness:     Adult Annual Physical   Patient with osteoporosis, RA, fibromyalgia and MDD here for a comprehensive physical exam  The patient reports no problems  She is scheduled for her colonoscopy next week  Diet and Physical Activity  · Diet/Nutrition: regular diet  · Exercise: does aquafit, stretches once weekly and walks frequently  Depression Screening  PHQ-9 Depression Screening    PHQ-9:    Frequency of the following problems over the past two weeks:            General Health  · Sleep: gets more than 8 hours of sleep on average  · Hearing: decreased - bilateral   · Vision: goes for regular eye exams  · Dental: regular dental visits         /GYN Health  · Patient is: postmenopausal  · Last menstrual period: at age 45  · Contraceptive method: none  Review of Systems:     Review of Systems   Constitutional: Negative  HENT: Positive for congestion, postnasal drip and rhinorrhea  Eyes: Positive for discharge  Respiratory: Negative  Cardiovascular: Negative  Gastrointestinal: Negative  Genitourinary:        Nocturia x1   Musculoskeletal: Positive for arthralgias and myalgias  Negative for joint swelling  Allergic/Immunologic: Positive for environmental allergies  Neurological: Positive for dizziness (positional) and headaches (sinus)  Negative for numbness  Psychiatric/Behavioral: Negative for dysphoric mood and sleep disturbance  The patient is not nervous/anxious         Past Medical History:     Past Medical History:   Diagnosis Date    Endometriosis       Past Surgical History:     Past Surgical History:   Procedure Laterality Date    OOPHORECTOMY      TONSILLECTOMY      TOOTH EXTRACTION      TOTAL ABDOMINAL HYSTERECTOMY  2005    at age 45      Social History:     Social History     Socioeconomic History    Marital status: /Civil Union     Spouse name: None    Number of children: 3    Years of education: None    Highest education level: None   Occupational History    Occupation: works for [employer]     Comment: FT, traveleld to [facility] Occupation: full-time employment   Social Needs    Financial resource strain: None    Food insecurity:     Worry: None     Inability: None    Transportation needs:     Medical: None     Non-medical: None   Tobacco Use    Smoking status: Never Smoker    Smokeless tobacco: Never Used   Substance and Sexual Activity    Alcohol use: Yes     Frequency: Monthly or less     Drinks per session: 1 or 2     Binge frequency: Never    Drug use: No    Sexual activity: Yes     Partners: Male     Birth control/protection: None     Comment: x 32 years   Lifestyle    Physical activity:     Days per week: None     Minutes per session: None    Stress: None   Relationships    Social connections:     Talks on phone: None     Gets together: None     Attends Christianity service: None     Active member of club or organization: None     Attends meetings of clubs or organizations: None     Relationship status: None    Intimate partner violence:     Fear of current or ex partner: None     Emotionally abused: None     Physically abused: None     Forced sexual activity: None   Other Topics Concern    None   Social History Narrative    Daily caffeine consumption, 2-3 serving a day      Family History:     Family History   Problem Relation Age of Onset    Hypertension Mother     Hypothyroidism Mother     Osteoporosis Mother     Pulmonary embolism Mother     Other Father         cardiac disorder    Lymphoma Father         chronic lymphoid leukemia    Emphysema Brother         lung    Osteoporosis Maternal Grandmother     Thyroid disease Maternal Grandmother     Transient ischemic attack Maternal Grandmother     Thyroid cancer Maternal Aunt     No Known Problems Sister     No Known Problems Son     No Known Problems Maternal Grandfather     No Known Problems Paternal Grandmother     No Known Problems Paternal Grandfather     No Known Problems Son     Allergies Son       Current Medications:     Current Outpatient Medications   Medication Sig Dispense Refill    Cholecalciferol (VITAMIN D) 2000 units tablet Take 2 tablets by mouth daily      cloNIDine (CATAPRES) 0 1 mg tablet Take 0 1 mg by mouth once       DULoxetine (CYMBALTA) 60 mg delayed release capsule Take 60 mg by mouth 2 (two) times a day       folic acid (FOLVITE) 1 mg tablet       HUMIRA PEN 40 MG/0 8ML PNKT 40 mg by Subcutaneous (multi inj) route       Magnesium 200 MG TABS Take 1 tablet by mouth daily      methotrexate 2 5 MG tablet 2 5 mg 8 tablets weekly      pregabalin (LYRICA) 100 mg capsule Take 1 capsule by mouth 2 (two) times a day      Turmeric 450 MG CAPS Take 1 capsule by mouth 2 (two) times a day      YUVAFEM 10 MCG TABS vaginal tablet Insert one tablet twice weekly 24 tablet 2    zaleplon (SONATA) 10 MG capsule Take 10 mg by mouth daily at bedtime        No current facility-administered medications for this visit  Allergies:     No Known Allergies   Physical Exam:     /72 (BP Location: Left arm, Patient Position: Sitting)   Pulse 74   Temp 98 7 °F (37 1 °C)   Resp 16   Ht 5' 4" (1 626 m)   Wt 64 kg (141 lb)   LMP  (LMP Unknown)   SpO2 99%   BMI 24 20 kg/m²     Physical Exam   Constitutional: She is oriented to person, place, and time  She appears well-developed and well-nourished  No distress  HENT:   Right Ear: External ear normal    Left Ear: External ear normal    Nose: Rhinorrhea present  Mouth/Throat: Oropharynx is clear and moist  No oropharyngeal exudate  Neck: Neck supple  Cardiovascular: Normal rate, regular rhythm, normal heart sounds and intact distal pulses  Pulmonary/Chest: Effort normal and breath sounds normal  No stridor  No respiratory distress  Abdominal: Soft  Bowel sounds are normal  She exhibits no distension  There is no tenderness  Musculoskeletal:   No small joint effusions noted   Neurological: She is alert and oriented to person, place, and time  Psychiatric: She has a normal mood and affect  Her behavior is normal    Vitals reviewed      Recent Results (from the past 1680 hour(s))   Lipid panel    Collection Time: 10/05/19  9:20 AM   Result Value Ref Range    Cholesterol 206 (H) 50 - 200 mg/dL    Triglycerides 82 <=150 mg/dL    HDL, Direct 69 (H) 40 - 60 mg/dL    LDL Calculated 121 (H) 0 - 100 mg/dL    Non-HDL-Chol (CHOL-HDL) 137 mg/dl       Dorcas Espinal DO  [facility] INTERNAL MEDICINE

## 2019-11-21 NOTE — PATIENT INSTRUCTIONS

## 2020-01-13 ENCOUNTER — OFFICE VISIT (OUTPATIENT)
Dept: INTERNAL MEDICINE CLINIC | Facility: CLINIC | Age: 60
End: 2020-01-13
Payer: COMMERCIAL

## 2020-01-13 VITALS
WEIGHT: 142 LBS | TEMPERATURE: 98 F | HEIGHT: 64 IN | HEART RATE: 74 BPM | BODY MASS INDEX: 24.24 KG/M2 | SYSTOLIC BLOOD PRESSURE: 126 MMHG | OXYGEN SATURATION: 98 % | DIASTOLIC BLOOD PRESSURE: 70 MMHG

## 2020-01-13 DIAGNOSIS — J01.00 ACUTE NON-RECURRENT MAXILLARY SINUSITIS: Primary | ICD-10-CM

## 2020-01-13 DIAGNOSIS — M06.9 RHEUMATOID ARTHRITIS, INVOLVING UNSPECIFIED SITE, UNSPECIFIED RHEUMATOID FACTOR PRESENCE: ICD-10-CM

## 2020-01-13 PROCEDURE — 1036F TOBACCO NON-USER: CPT | Performed by: INTERNAL MEDICINE

## 2020-01-13 PROCEDURE — 99214 OFFICE O/P EST MOD 30 MIN: CPT | Performed by: INTERNAL MEDICINE

## 2020-01-13 PROCEDURE — 3008F BODY MASS INDEX DOCD: CPT | Performed by: INTERNAL MEDICINE

## 2020-01-13 RX ORDER — AMOXICILLIN AND CLAVULANATE POTASSIUM 875; 125 MG/1; MG/1
1 TABLET, FILM COATED ORAL EVERY 12 HOURS SCHEDULED
Qty: 14 TABLET | Refills: 0 | Status: SHIPPED | OUTPATIENT
Start: 2020-01-13 | End: 2020-01-20

## 2020-01-13 NOTE — PROGRESS NOTES
Assessment/Plan:    Problem List Items Addressed This Visit        Musculoskeletal and Integument    Rheumatoid arthritis ([address] )      Other Visit Diagnoses     Acute non-recurrent maxillary sinusitis    -  Primary    will treat as patient is on immunosuppressants  Start Augmentin and nasal spray  Side effects discussed  Call if not improved  Relevant Medications    amoxicillin-clavulanate (AUGMENTIN) 875-125 mg per tablet          Subjective:      Patient ID: Dian Hobbs is a 64 y o  female  HPI  60yo female with RA, fibromyalgia, osteoporosis and MDD here for evaluation of cold symptoms  Reports HA, sinus pressure especially behind her eyes and dry cough  Symptoms began over 1 week ago  She has used sinus and HA and cough medication OTC  Reports her son had cold symptoms  She denies fever, sore throat, n/v/d  Has had mild lightheadedness, PND, ear fullness  Symptoms are about the same since they started      The following portions of the patient's history were reviewed and updated as appropriate: allergies, current medications, past family history, past medical history, past social history, past surgical history and problem list       Current Outpatient Medications:     Cholecalciferol (VITAMIN D) 2000 units tablet, Take 2 tablets by mouth daily, Disp: , Rfl:     cloNIDine (CATAPRES) 0 1 mg tablet, Take 0 1 mg by mouth once , Disp: , Rfl:     DULoxetine (CYMBALTA) 60 mg delayed release capsule, Take 60 mg by mouth 2 (two) times a day , Disp: , Rfl:     folic acid (FOLVITE) 1 mg tablet, , Disp: , Rfl:     HUMIRA PEN 40 MG/0 8ML PNKT, 40 mg by Subcutaneous (multi inj) route , Disp: , Rfl:     Magnesium 200 MG TABS, Take 1 tablet by mouth daily, Disp: , Rfl:     methotrexate 2 5 MG tablet, 2 5 mg 8 tablets weekly, Disp: , Rfl:     pregabalin (LYRICA) 100 mg capsule, Take 1 capsule by mouth 2 (two) times a day, Disp: , Rfl:     Turmeric 450 MG CAPS, Take 1 capsule by mouth 2 (two) times a day, Disp: , Rfl:     YUVAFEM 10 MCG TABS vaginal tablet, Insert one tablet twice weekly, Disp: 24 tablet, Rfl: 2    zaleplon (SONATA) 10 MG capsule, Take 10 mg by mouth daily at bedtime , Disp: , Rfl:     amoxicillin-clavulanate (AUGMENTIN) 875-125 mg per tablet, Take 1 tablet by mouth every 12 (twelve) hours for 7 days, Disp: 14 tablet, Rfl: 0    Review of Systems   Constitutional: Positive for fatigue  Negative for fever  HENT: Positive for congestion, postnasal drip, rhinorrhea, sinus pressure and sore throat  Negative for sneezing  Ear fullness   Respiratory: Positive for cough  Negative for shortness of breath and wheezing  Cardiovascular: Negative  Gastrointestinal: Negative  Neurological: Positive for dizziness and headaches  Objective:    /70 (BP Location: Left arm, Patient Position: Sitting, Cuff Size: Adult)   Pulse 74   Temp 98 °F (36 7 °C) (Tympanic)   Ht 5' 4" (1 626 m)   Wt 64 4 kg (142 lb)   LMP  (LMP Unknown)   SpO2 98%   BMI 24 37 kg/m²      Physical Exam   Constitutional: She appears well-developed and well-nourished  She does not appear ill  HENT:   Right Ear: External ear normal    Left Ear: External ear normal    Nose: Rhinorrhea present  Mouth/Throat: Oropharynx is clear and moist  No oropharyngeal exudate  Cardiovascular: Normal rate, regular rhythm and normal heart sounds  Pulmonary/Chest: Effort normal and breath sounds normal  No stridor  No respiratory distress  Dry cough   Lymphadenopathy:     She has no cervical adenopathy  Neurological: She is alert  Psychiatric: She has a normal mood and affect  Vitals reviewed

## 2020-03-16 DIAGNOSIS — N95.2 VAGINAL ATROPHY: ICD-10-CM

## 2020-03-16 RX ORDER — ESTRADIOL 10 UG/1
TABLET VAGINAL
Qty: 24 TABLET | Refills: 0 | Status: SHIPPED | OUTPATIENT
Start: 2020-03-16 | End: 2020-06-04

## 2020-06-03 DIAGNOSIS — N95.2 VAGINAL ATROPHY: ICD-10-CM

## 2020-06-04 RX ORDER — ESTRADIOL 10 UG/1
TABLET VAGINAL
Qty: 24 TABLET | Refills: 0 | Status: SHIPPED | OUTPATIENT
Start: 2020-06-04 | End: 2020-06-12

## 2020-06-11 DIAGNOSIS — N95.2 VAGINAL ATROPHY: ICD-10-CM

## 2020-06-12 RX ORDER — ESTRADIOL 10 UG/1
TABLET VAGINAL
Qty: 24 TABLET | Refills: 0 | Status: SHIPPED | OUTPATIENT
Start: 2020-06-12 | End: 2020-10-01

## 2020-09-30 DIAGNOSIS — N95.2 VAGINAL ATROPHY: ICD-10-CM

## 2020-10-01 RX ORDER — ESTRADIOL 10 UG/1
TABLET VAGINAL
Qty: 24 TABLET | Refills: 0 | Status: SHIPPED | OUTPATIENT
Start: 2020-10-01 | End: 2021-03-07

## 2020-10-03 ENCOUNTER — APPOINTMENT (OUTPATIENT)
Dept: LAB | Age: 60
End: 2020-10-03
Payer: COMMERCIAL

## 2020-10-03 DIAGNOSIS — Z11.4 SCREENING FOR HIV (HUMAN IMMUNODEFICIENCY VIRUS): ICD-10-CM

## 2020-10-03 DIAGNOSIS — Z11.59 NEED FOR HEPATITIS C SCREENING TEST: ICD-10-CM

## 2020-10-03 DIAGNOSIS — Z13.6 SCREENING FOR CARDIOVASCULAR CONDITION: ICD-10-CM

## 2020-10-03 LAB
CHOLEST SERPL-MCNC: 199 MG/DL (ref 50–200)
HCV AB SER QL: NORMAL
HDLC SERPL-MCNC: 66 MG/DL
LDLC SERPL CALC-MCNC: 119 MG/DL (ref 0–100)
NONHDLC SERPL-MCNC: 133 MG/DL
TRIGL SERPL-MCNC: 70 MG/DL

## 2020-10-03 PROCEDURE — 36415 COLL VENOUS BLD VENIPUNCTURE: CPT

## 2020-10-03 PROCEDURE — 87389 HIV-1 AG W/HIV-1&-2 AB AG IA: CPT

## 2020-10-03 PROCEDURE — 86803 HEPATITIS C AB TEST: CPT

## 2020-10-03 PROCEDURE — 80061 LIPID PANEL: CPT

## 2020-10-04 LAB — HIV 1+2 AB+HIV1 P24 AG SERPL QL IA: NORMAL

## 2020-10-15 ENCOUNTER — TRANSCRIBE ORDERS (OUTPATIENT)
Dept: LAB | Facility: CLINIC | Age: 60
End: 2020-10-15

## 2020-10-19 ENCOUNTER — HOSPITAL ENCOUNTER (OUTPATIENT)
Dept: RADIOLOGY | Age: 60
Discharge: HOME | End: 2020-10-19
Payer: COMMERCIAL

## 2020-10-19 VITALS — HEIGHT: 64 IN | WEIGHT: 142 LBS | BODY MASS INDEX: 24.24 KG/M2

## 2020-10-19 DIAGNOSIS — Z12.31 ENCOUNTER FOR SCREENING MAMMOGRAM FOR MALIGNANT NEOPLASM OF BREAST: ICD-10-CM

## 2020-10-19 PROCEDURE — 77067 SCR MAMMO BI INCL CAD: CPT

## 2020-10-19 PROCEDURE — 77063 BREAST TOMOSYNTHESIS BI: CPT

## 2020-10-20 DIAGNOSIS — R92.8 ABNORMAL MAMMOGRAM OF RIGHT BREAST: Primary | ICD-10-CM

## 2020-10-22 ENCOUNTER — TELEPHONE (OUTPATIENT)
Dept: GYNECOLOGY | Facility: CLINIC | Age: 60
End: 2020-10-22

## 2020-11-03 ENCOUNTER — HOSPITAL ENCOUNTER (OUTPATIENT)
Dept: ULTRASOUND IMAGING | Facility: CLINIC | Age: 60
Discharge: HOME | End: 2020-11-03
Payer: COMMERCIAL

## 2020-11-03 ENCOUNTER — HOSPITAL ENCOUNTER (OUTPATIENT)
Dept: MAMMOGRAPHY | Facility: CLINIC | Age: 60
Discharge: HOME | End: 2020-11-03
Payer: COMMERCIAL

## 2020-11-03 VITALS — WEIGHT: 142 LBS | TEMPERATURE: 95.8 F | HEIGHT: 64 IN | BODY MASS INDEX: 24.24 KG/M2

## 2020-11-03 DIAGNOSIS — R92.8 ABNORMAL MAMMOGRAM OF RIGHT BREAST: ICD-10-CM

## 2020-11-03 PROCEDURE — 77065 DX MAMMO INCL CAD UNI: CPT

## 2020-11-03 PROCEDURE — G0279 TOMOSYNTHESIS, MAMMO: HCPCS

## 2020-11-23 ENCOUNTER — OFFICE VISIT (OUTPATIENT)
Dept: INTERNAL MEDICINE CLINIC | Facility: CLINIC | Age: 60
End: 2020-11-23
Payer: COMMERCIAL

## 2020-11-23 VITALS
TEMPERATURE: 97.5 F | RESPIRATION RATE: 16 BRPM | WEIGHT: 142.6 LBS | HEART RATE: 71 BPM | OXYGEN SATURATION: 96 % | HEIGHT: 64 IN | SYSTOLIC BLOOD PRESSURE: 110 MMHG | BODY MASS INDEX: 24.34 KG/M2 | DIASTOLIC BLOOD PRESSURE: 70 MMHG

## 2020-11-23 DIAGNOSIS — Z00.00 ANNUAL PHYSICAL EXAM: Primary | ICD-10-CM

## 2020-11-23 DIAGNOSIS — Z23 ENCOUNTER FOR IMMUNIZATION: ICD-10-CM

## 2020-11-23 PROCEDURE — 99396 PREV VISIT EST AGE 40-64: CPT | Performed by: INTERNAL MEDICINE

## 2020-11-23 PROCEDURE — 90471 IMMUNIZATION ADMIN: CPT

## 2020-11-23 PROCEDURE — 90686 IIV4 VACC NO PRSV 0.5 ML IM: CPT

## 2020-11-23 PROCEDURE — 3008F BODY MASS INDEX DOCD: CPT | Performed by: INTERNAL MEDICINE

## 2020-11-23 PROCEDURE — 1036F TOBACCO NON-USER: CPT | Performed by: INTERNAL MEDICINE

## 2020-11-23 RX ORDER — ESZOPICLONE 3 MG/1
3 TABLET, FILM COATED ORAL
COMMUNITY
Start: 2020-09-10

## 2021-03-07 NOTE — PROGRESS NOTES
Assessment/Plan:    Calcium 1200-1500mg + 600-1000 IU Vit D daily  Exercise 150 minutes per week minimum including weight bearing exercises  Pap with high risk HPV Q 5 years-done today  Annual mammogram (due 11/21) and monthly breast self exam recommended  Colonoscopy-done 2018  Kegels 20 times twice daily  Silicone based lubricant with sex  Vaginal moisturizers twice weekly as needed  Diagnoses and all orders for this visit:    Encntr for gyn exam (general) (routine) w/o abn findings  -     Liquid-based pap, screening    Encounter for screening mammogram for malignant neoplasm of breast  -     Mammo screening bilateral w 3d & cad; Future    Screening for human papillomavirus (HPV)  -     Liquid-based pap, screening    Vaginal atrophy  -     Yuvafem 10 MCG TABS vaginal tablet; Insert 1 tablet (10 mcg total) into the vagina 2 (two) times a week    Other orders  -     Humira Pen 40 MG/0 4ML PNKT; INJECT 1 PEN SUBCUTANEOUSLY EVERY 2 WEEKS IN THE ABDOMEN OR THIGH (ROTATE SITES)          Subjective:      Patient ID: Heidy Barba is a 64 y o  female  Heidy Barba is a 64 y o  female who is here today for her annual visit  No health concerns  Hx of abd hysterectomy-suprcervical and oopherectomy for pelvic pain/endometriosis  No cancer history  Exercises by walking 2 times per week  Heidy Barba is sexually active with male partner of 34 years  Hx of fibromyalgia which is stable  She now has 3 grandchild-4 (jean carlos) , 2 yo and fostering a 3 yo who they hope to adopt  They live in [facility] and her  visit them about 5 times per year  Antoher son lives in [place] and other in [facility]  The following portions of the patient's history were reviewed and updated as appropriate: allergies, current medications, past family history, past medical history, past social history, past surgical history and problem list     Review of Systems   Constitutional: Negative    Negative for activity change, appetite change, chills, diaphoresis, fatigue, fever and unexpected weight change  HENT: Negative for congestion, dental problem, sneezing, sore throat and trouble swallowing  Eyes: Negative for visual disturbance  Respiratory: Negative for chest tightness and shortness of breath  Cardiovascular: Negative for chest pain and leg swelling  Gastrointestinal: Negative for abdominal pain, constipation, diarrhea, nausea and vomiting  Genitourinary: Negative for difficulty urinating, dyspareunia, dysuria, frequency, hematuria, pelvic pain, urgency, vaginal bleeding, vaginal discharge and vaginal pain  Musculoskeletal: Negative for back pain and neck pain  Skin: Negative  Allergic/Immunologic: Negative  Neurological: Negative for weakness and headaches  Hematological: Negative for adenopathy  Psychiatric/Behavioral: Negative  Objective:      /62 (BP Location: Left arm, Patient Position: Sitting, Cuff Size: Standard)   Pulse 76   Ht 5' 4 5" (1 638 m)   Wt 63 5 kg (140 lb)   LMP  (LMP Unknown)   BMI 23 66 kg/m²          Physical Exam  Vitals signs and nursing note reviewed  Constitutional:       Appearance: Normal appearance  She is well-developed  HENT:      Head: Normocephalic and atraumatic  Eyes:      General:         Right eye: No discharge  Left eye: No discharge  Neck:      Musculoskeletal: Normal range of motion and neck supple  Thyroid: No thyromegaly  Trachea: Trachea normal    Cardiovascular:      Rate and Rhythm: Normal rate and regular rhythm  Heart sounds: Normal heart sounds  Pulmonary:      Effort: Pulmonary effort is normal       Breath sounds: Normal breath sounds  Chest:      Breasts: Breasts are symmetrical          Right: Normal  No inverted nipple, mass, nipple discharge, skin change or tenderness  Left: Normal  No inverted nipple, mass, nipple discharge, skin change or tenderness     Abdominal: Palpations: Abdomen is soft  Genitourinary:     General: Normal vulva  Exam position: Lithotomy position  Labia:         Right: No rash, tenderness, lesion or injury  Left: No rash, tenderness, lesion or injury  Urethra: No prolapse, urethral pain, urethral swelling or urethral lesion  Vagina: Normal  No signs of injury and foreign body  No vaginal discharge, erythema, tenderness or bleeding  Cervix: Normal       Uterus: Absent  Rectum: No external hemorrhoid  Comments: Uterus and adnexa are surgically absent  Musculoskeletal: Normal range of motion  Lymphadenopathy:      Head:      Right side of head: No submental, submandibular or tonsillar adenopathy  Left side of head: No submental, submandibular or tonsillar adenopathy  Cervical: No cervical adenopathy  Upper Body:      Right upper body: No supraclavicular or axillary adenopathy  Left upper body: No supraclavicular or axillary adenopathy  Lower Body: No right inguinal adenopathy  No left inguinal adenopathy  Skin:     General: Skin is warm and dry  Neurological:      Mental Status: She is alert and oriented to person, place, and time  Psychiatric:         Mood and Affect: Mood normal          Behavior: Behavior normal          Thought Content:  Thought content normal          Judgment: Judgment normal

## 2021-03-08 ENCOUNTER — ANNUAL EXAM (OUTPATIENT)
Dept: OBGYN CLINIC | Facility: CLINIC | Age: 61
End: 2021-03-08
Payer: COMMERCIAL

## 2021-03-08 VITALS
HEIGHT: 65 IN | BODY MASS INDEX: 23.32 KG/M2 | SYSTOLIC BLOOD PRESSURE: 108 MMHG | DIASTOLIC BLOOD PRESSURE: 62 MMHG | HEART RATE: 76 BPM | WEIGHT: 140 LBS

## 2021-03-08 DIAGNOSIS — Z01.419 ENCNTR FOR GYN EXAM (GENERAL) (ROUTINE) W/O ABN FINDINGS: Primary | ICD-10-CM

## 2021-03-08 DIAGNOSIS — Z11.51 SCREENING FOR HUMAN PAPILLOMAVIRUS (HPV): ICD-10-CM

## 2021-03-08 DIAGNOSIS — Z12.31 ENCOUNTER FOR SCREENING MAMMOGRAM FOR MALIGNANT NEOPLASM OF BREAST: ICD-10-CM

## 2021-03-08 DIAGNOSIS — N95.2 VAGINAL ATROPHY: ICD-10-CM

## 2021-03-08 PROCEDURE — 87624 HPV HI-RISK TYP POOLED RSLT: CPT | Performed by: NURSE PRACTITIONER

## 2021-03-08 PROCEDURE — S0612 ANNUAL GYNECOLOGICAL EXAMINA: HCPCS | Performed by: NURSE PRACTITIONER

## 2021-03-08 PROCEDURE — G0143 SCR C/V CYTO,THINLAYER,RESCR: HCPCS | Performed by: NURSE PRACTITIONER

## 2021-03-08 RX ORDER — ESTRADIOL 10 UG/1
1 TABLET VAGINAL 2 TIMES WEEKLY
Qty: 24 TABLET | Refills: 3 | Status: SHIPPED | OUTPATIENT
Start: 2021-03-08 | End: 2022-04-01 | Stop reason: ALTCHOICE

## 2021-03-08 RX ORDER — ADALIMUMAB 40MG/0.4ML
KIT SUBCUTANEOUS
COMMUNITY
Start: 2021-02-15 | End: 2022-01-13 | Stop reason: SDUPTHER

## 2021-03-08 NOTE — PATIENT INSTRUCTIONS
Calcium 1200-1500mg + 600-1000 IU Vit D daily  Exercise 150 minutes per week minimum including weight bearing exercises  Pap with high risk HPV Q 5 years-done today  Annual mammogram (due 11/21) and monthly breast self exam recommended  Colonoscopy-done 2018  Kegels 20 times twice daily  Silicone based lubricant with sex  Vaginal moisturizers twice weekly as needed

## 2021-03-10 LAB
HPV HR 12 DNA CVX QL NAA+PROBE: NEGATIVE
HPV16 DNA CVX QL NAA+PROBE: NEGATIVE
HPV18 DNA CVX QL NAA+PROBE: NEGATIVE

## 2021-03-15 LAB
LAB AP GYN PRIMARY INTERPRETATION: NORMAL
Lab: NORMAL

## 2021-05-23 NOTE — PROGRESS NOTES
Assessment/Plan:    Problem List Items Addressed This Visit        Musculoskeletal and Integument    Rheumatoid arthritis ([facility] [address] ) - Primary     -esr normal  -continue on Humira and MTX per Rheum            Other    Fibromyalgia     -stable  -continue on duloxetine 60mg twice daily and lyrica 100mg twice daily as per Rheum         MDD (major depressive disorder)     -in remission on clonidine and duloxetine 60mg twice daily  -continue follow up with Psych         Vitamin D deficiency     -sufficient  -continue on maintenance vitamin D 4000units daily           Other Visit Diagnoses     Colon cancer screening        Relevant Orders    Cologuard    Screening for cardiovascular condition        Relevant Orders    Lipid panel          Subjective:      Patient ID: Lani Chapin is a 64 y o  female  HPI  65yo female with fibromyalgia, RA, osteoporosis and MDD here for follow up care  She is fully vaccinated  She plans to spend sometime with her son and his wife  She denies fibromyalgia or RA flares  She continues with Humira q2 weeks and MTX  She did notice R ankle swelling but it has come down  No pain  Admits to high sodium diet  PHQ-9 Depression Screening    PHQ-9:   Frequency of the following problems over the past two weeks:      Little interest or pleasure in doing things: 0 - not at all  Feeling down, depressed, or hopeless: 0 - not at all  Trouble falling or staying asleep, or sleeping too much: 1 - several days  Feeling tired or having little energy: 1 - several days  Poor appetite or overeatin - not at all  Feeling bad about yourself - or that you are a failure or have let yourself or your family down: 1 - several days  Trouble concentrating on things, such as reading the newspaper or watching television: 0 - not at all  Moving or speaking so slowly that other people could have noticed   Or the opposite - being so fidgety or restless that you have been moving around a lot more than usual: 0 - not at all  Thoughts that you would be better off dead, or of hurting yourself in some way: 0 - not at all  PHQ-2 Score: 0  PHQ-9 Score: 3           The following portions of the patient's history were reviewed and updated as appropriate: allergies, current medications, past family history, past medical history, past social history, past surgical history and problem list       Current Outpatient Medications:     Cholecalciferol (VITAMIN D) 2000 units tablet, Take 2 tablets by mouth daily, Disp: , Rfl:     cloNIDine (CATAPRES) 0 1 mg tablet, Take 0 1 mg by mouth once , Disp: , Rfl:     DULoxetine (CYMBALTA) 60 mg delayed release capsule, Take 60 mg by mouth 2 (two) times a day , Disp: , Rfl:     eszopiclone (LUNESTA) 3 MG tablet, Take 3 mg by mouth daily at bedtime, Disp: , Rfl:     folic acid (FOLVITE) 1 mg tablet, , Disp: , Rfl:     Humira Pen 40 MG/0 4ML PNKT, INJECT 1 PEN SUBCUTANEOUSLY EVERY 2 WEEKS IN THE ABDOMEN OR THIGH (ROTATE SITES), Disp: , Rfl:     HUMIRA PEN 40 MG/0 8ML PNKT, 40 mg by Subcutaneous (multi inj) route , Disp: , Rfl:     Magnesium 200 MG TABS, Take 1 tablet by mouth daily, Disp: , Rfl:     methotrexate 2 5 MG tablet, 2 5 mg 8 tablets weekly, Disp: , Rfl:     pregabalin (LYRICA) 100 mg capsule, Take 1 capsule by mouth 2 (two) times a day, Disp: , Rfl:     Turmeric 450 MG CAPS, Take 1 capsule by mouth 2 (two) times a day, Disp: , Rfl:     Yuvafem 10 MCG TABS vaginal tablet, Insert 1 tablet (10 mcg total) into the vagina 2 (two) times a week, Disp: 24 tablet, Rfl: 3    Review of Systems   Constitutional: Positive for fatigue  Negative for activity change, appetite change and unexpected weight change  HENT: Negative for congestion, postnasal drip and rhinorrhea  Respiratory: Negative for cough, chest tightness, shortness of breath and wheezing  Cardiovascular: Negative for chest pain, palpitations and leg swelling     Gastrointestinal: Negative for abdominal pain, diarrhea and nausea  Genitourinary: Negative for difficulty urinating  Musculoskeletal: Positive for arthralgias  Neurological: Positive for headaches  Negative for dizziness  Psychiatric/Behavioral: Positive for decreased concentration, dysphoric mood and sleep disturbance  The patient is nervous/anxious  Objective:    /64   Pulse 74   Temp (!) 97 4 °F (36 3 °C) (Skin)   Resp 16   Ht 5' 4 5" (1 638 m)   Wt 64 4 kg (142 lb)   LMP  (LMP Unknown)   SpO2 98%   BMI 24 00 kg/m²      Physical Exam  Vitals signs reviewed  Constitutional:       General: She is not in acute distress  Appearance: Normal appearance  She is not diaphoretic  Cardiovascular:      Rate and Rhythm: Normal rate and regular rhythm  Pulses: Normal pulses  Heart sounds: Normal heart sounds  Pulmonary:      Effort: Pulmonary effort is normal  No respiratory distress  Breath sounds: Normal breath sounds  No wheezing  Abdominal:      General: Bowel sounds are normal  There is no distension  Palpations: Abdomen is soft  Tenderness: There is no abdominal tenderness  Musculoskeletal:      Right lower leg: No edema  Left lower leg: No edema  Comments: Minimal small joint hypertrophy   Neurological:      General: No focal deficit present  Mental Status: She is alert and oriented to person, place, and time  Psychiatric:         Attention and Perception: Attention normal          Mood and Affect: Mood and affect normal          Speech: Speech normal          Behavior: Behavior is cooperative  Labs obtained from HNL were reviewed and discussed with the patient

## 2021-05-24 ENCOUNTER — OFFICE VISIT (OUTPATIENT)
Dept: INTERNAL MEDICINE CLINIC | Facility: CLINIC | Age: 61
End: 2021-05-24
Payer: COMMERCIAL

## 2021-05-24 VITALS
WEIGHT: 142 LBS | RESPIRATION RATE: 16 BRPM | HEIGHT: 65 IN | HEART RATE: 74 BPM | BODY MASS INDEX: 23.66 KG/M2 | DIASTOLIC BLOOD PRESSURE: 64 MMHG | OXYGEN SATURATION: 98 % | SYSTOLIC BLOOD PRESSURE: 106 MMHG | TEMPERATURE: 97.4 F

## 2021-05-24 DIAGNOSIS — M79.7 FIBROMYALGIA: ICD-10-CM

## 2021-05-24 DIAGNOSIS — Z12.11 COLON CANCER SCREENING: ICD-10-CM

## 2021-05-24 DIAGNOSIS — E55.9 VITAMIN D DEFICIENCY: ICD-10-CM

## 2021-05-24 DIAGNOSIS — F33.42 RECURRENT MAJOR DEPRESSIVE DISORDER, IN FULL REMISSION (HCC): ICD-10-CM

## 2021-05-24 DIAGNOSIS — Z13.6 SCREENING FOR CARDIOVASCULAR CONDITION: ICD-10-CM

## 2021-05-24 DIAGNOSIS — M06.9 RHEUMATOID ARTHRITIS, INVOLVING UNSPECIFIED SITE, UNSPECIFIED WHETHER RHEUMATOID FACTOR PRESENT (HCC): Primary | ICD-10-CM

## 2021-05-24 PROCEDURE — 1036F TOBACCO NON-USER: CPT | Performed by: INTERNAL MEDICINE

## 2021-05-24 PROCEDURE — 3008F BODY MASS INDEX DOCD: CPT | Performed by: INTERNAL MEDICINE

## 2021-05-24 PROCEDURE — 99214 OFFICE O/P EST MOD 30 MIN: CPT | Performed by: INTERNAL MEDICINE

## 2021-05-24 PROCEDURE — 3725F SCREEN DEPRESSION PERFORMED: CPT | Performed by: INTERNAL MEDICINE

## 2021-06-01 ENCOUNTER — TRANSCRIBE ORDERS (OUTPATIENT)
Dept: ADMINISTRATIVE | Facility: HOSPITAL | Age: 61
End: 2021-06-01

## 2021-06-01 DIAGNOSIS — M06.09 RHEUMATOID ARTHRITIS WITHOUT RHEUMATOID FACTOR, MULTIPLE SITES (HCC): Primary | ICD-10-CM

## 2021-07-20 ENCOUNTER — HOSPITAL ENCOUNTER (OUTPATIENT)
Dept: RADIOLOGY | Facility: HOSPITAL | Age: 61
Discharge: HOME | End: 2021-07-20
Payer: COMMERCIAL

## 2021-07-20 DIAGNOSIS — M06.09 RHEUMATOID ARTHRITIS WITHOUT RHEUMATOID FACTOR, MULTIPLE SITES (HCC): ICD-10-CM

## 2021-07-20 PROCEDURE — 76882 US LMTD JT/FCL EVL NVASC XTR: CPT

## 2021-10-21 ENCOUNTER — HOSPITAL ENCOUNTER (OUTPATIENT)
Dept: RADIOLOGY | Age: 61
Discharge: HOME | End: 2021-10-21
Payer: COMMERCIAL

## 2021-10-21 VITALS — BODY MASS INDEX: 24.32 KG/M2 | HEIGHT: 65 IN | WEIGHT: 146 LBS

## 2021-10-21 DIAGNOSIS — Z12.31 ENCOUNTER FOR SCREENING MAMMOGRAM FOR MALIGNANT NEOPLASM OF BREAST: ICD-10-CM

## 2021-10-21 PROCEDURE — 77063 BREAST TOMOSYNTHESIS BI: CPT

## 2021-10-21 PROCEDURE — 77067 SCR MAMMO BI INCL CAD: CPT

## 2021-11-09 ENCOUNTER — APPOINTMENT (OUTPATIENT)
Dept: LAB | Age: 61
End: 2021-11-09
Payer: COMMERCIAL

## 2021-11-09 DIAGNOSIS — Z13.6 SCREENING FOR CARDIOVASCULAR CONDITION: ICD-10-CM

## 2021-11-09 DIAGNOSIS — M06.09 RHEUMATOID ARTHRITIS OF MULTIPLE SITES WITHOUT RHEUMATOID FACTOR (HCC): ICD-10-CM

## 2021-11-09 LAB
ALBUMIN SERPL BCP-MCNC: 3.4 G/DL (ref 3.5–5)
ALP SERPL-CCNC: 44 U/L (ref 46–116)
ALT SERPL W P-5'-P-CCNC: 25 U/L (ref 12–78)
ANION GAP SERPL CALCULATED.3IONS-SCNC: 1 MMOL/L (ref 4–13)
AST SERPL W P-5'-P-CCNC: 21 U/L (ref 5–45)
BASOPHILS # BLD AUTO: 0.05 THOUSANDS/ΜL (ref 0–0.1)
BASOPHILS NFR BLD AUTO: 1 % (ref 0–1)
BILIRUB SERPL-MCNC: 0.44 MG/DL (ref 0.2–1)
BUN SERPL-MCNC: 11 MG/DL (ref 5–25)
CALCIUM ALBUM COR SERPL-MCNC: 10 MG/DL (ref 8.3–10.1)
CALCIUM SERPL-MCNC: 9.5 MG/DL (ref 8.3–10.1)
CHLORIDE SERPL-SCNC: 106 MMOL/L (ref 100–108)
CHOLEST SERPL-MCNC: 177 MG/DL (ref 50–200)
CO2 SERPL-SCNC: 31 MMOL/L (ref 21–32)
CREAT SERPL-MCNC: 0.75 MG/DL (ref 0.6–1.3)
EOSINOPHIL # BLD AUTO: 0.13 THOUSAND/ΜL (ref 0–0.61)
EOSINOPHIL NFR BLD AUTO: 2 % (ref 0–6)
ERYTHROCYTE [DISTWIDTH] IN BLOOD BY AUTOMATED COUNT: 14.7 % (ref 11.6–15.1)
ERYTHROCYTE [SEDIMENTATION RATE] IN BLOOD: 8 MM/HOUR (ref 0–29)
GFR SERPL CREATININE-BSD FRML MDRD: 88 ML/MIN/1.73SQ M
GLUCOSE P FAST SERPL-MCNC: 104 MG/DL (ref 65–99)
HCT VFR BLD AUTO: 39.8 % (ref 34.8–46.1)
HDLC SERPL-MCNC: 62 MG/DL
HGB BLD-MCNC: 12.9 G/DL (ref 11.5–15.4)
IMM GRANULOCYTES # BLD AUTO: 0.01 THOUSAND/UL (ref 0–0.2)
IMM GRANULOCYTES NFR BLD AUTO: 0 % (ref 0–2)
LDLC SERPL CALC-MCNC: 91 MG/DL (ref 0–100)
LYMPHOCYTES # BLD AUTO: 3.01 THOUSANDS/ΜL (ref 0.6–4.47)
LYMPHOCYTES NFR BLD AUTO: 56 % (ref 14–44)
MCH RBC QN AUTO: 31.9 PG (ref 26.8–34.3)
MCHC RBC AUTO-ENTMCNC: 32.4 G/DL (ref 31.4–37.4)
MCV RBC AUTO: 98 FL (ref 82–98)
MONOCYTES # BLD AUTO: 0.36 THOUSAND/ΜL (ref 0.17–1.22)
MONOCYTES NFR BLD AUTO: 7 % (ref 4–12)
NEUTROPHILS # BLD AUTO: 1.86 THOUSANDS/ΜL (ref 1.85–7.62)
NEUTS SEG NFR BLD AUTO: 34 % (ref 43–75)
NONHDLC SERPL-MCNC: 115 MG/DL
NRBC BLD AUTO-RTO: 0 /100 WBCS
PLATELET # BLD AUTO: 271 THOUSANDS/UL (ref 149–390)
PMV BLD AUTO: 10.6 FL (ref 8.9–12.7)
POTASSIUM SERPL-SCNC: 4.4 MMOL/L (ref 3.5–5.3)
PROT SERPL-MCNC: 7.1 G/DL (ref 6.4–8.2)
RBC # BLD AUTO: 4.05 MILLION/UL (ref 3.81–5.12)
SODIUM SERPL-SCNC: 138 MMOL/L (ref 136–145)
TRIGL SERPL-MCNC: 120 MG/DL
WBC # BLD AUTO: 5.42 THOUSAND/UL (ref 4.31–10.16)

## 2021-11-09 PROCEDURE — 36415 COLL VENOUS BLD VENIPUNCTURE: CPT

## 2021-11-09 PROCEDURE — 80061 LIPID PANEL: CPT

## 2021-11-09 PROCEDURE — 85025 COMPLETE CBC W/AUTO DIFF WBC: CPT

## 2021-11-09 PROCEDURE — 85652 RBC SED RATE AUTOMATED: CPT

## 2021-11-09 PROCEDURE — 80053 COMPREHEN METABOLIC PANEL: CPT

## 2021-11-10 PROBLEM — F32.A DEPRESSION: Status: ACTIVE | Noted: 2017-08-16

## 2021-11-10 PROBLEM — M35.00 SJOGREN'S SYNDROME WITHOUT EXTRAGLANDULAR INVOLVEMENT (HCC): Status: ACTIVE | Noted: 2021-11-10

## 2021-11-10 PROBLEM — M17.0 PRIMARY OSTEOARTHRITIS OF BOTH KNEES: Status: ACTIVE | Noted: 2021-11-10

## 2021-11-10 PROBLEM — M15.0 PRIMARY GENERALIZED (OSTEO)ARTHRITIS: Status: ACTIVE | Noted: 2021-11-10

## 2022-01-27 ENCOUNTER — APPOINTMENT (OUTPATIENT)
Dept: RADIOLOGY | Age: 62
End: 2022-01-27
Payer: COMMERCIAL

## 2022-01-27 ENCOUNTER — APPOINTMENT (OUTPATIENT)
Dept: LAB | Age: 62
End: 2022-01-27
Payer: COMMERCIAL

## 2022-01-27 DIAGNOSIS — M06.09 RHEUMATOID ARTHRITIS OF MULTIPLE SITES WITH NEGATIVE RHEUMATOID FACTOR (HCC): ICD-10-CM

## 2022-01-27 DIAGNOSIS — Z11.59 SCREENING FOR VIRAL DISEASE: ICD-10-CM

## 2022-01-27 LAB
HBV CORE AB SER QL: NORMAL
HBV CORE IGM SER QL: NORMAL
HBV SURFACE AG SER QL: NORMAL
HCV AB SER QL: NORMAL

## 2022-01-27 PROCEDURE — 86705 HEP B CORE ANTIBODY IGM: CPT

## 2022-01-27 PROCEDURE — 87340 HEPATITIS B SURFACE AG IA: CPT

## 2022-01-27 PROCEDURE — 86704 HEP B CORE ANTIBODY TOTAL: CPT

## 2022-01-27 PROCEDURE — 73562 X-RAY EXAM OF KNEE 3: CPT

## 2022-01-27 PROCEDURE — 86803 HEPATITIS C AB TEST: CPT

## 2022-01-27 PROCEDURE — 36415 COLL VENOUS BLD VENIPUNCTURE: CPT

## 2022-02-17 PROBLEM — L65.9 HAIR LOSS: Status: ACTIVE | Noted: 2022-02-17

## 2022-04-01 ENCOUNTER — ANNUAL EXAM (OUTPATIENT)
Dept: OBGYN CLINIC | Facility: CLINIC | Age: 62
End: 2022-04-01
Payer: COMMERCIAL

## 2022-04-01 VITALS
WEIGHT: 151.2 LBS | HEIGHT: 64 IN | BODY MASS INDEX: 25.81 KG/M2 | DIASTOLIC BLOOD PRESSURE: 72 MMHG | SYSTOLIC BLOOD PRESSURE: 120 MMHG

## 2022-04-01 DIAGNOSIS — Z01.419 ENCNTR FOR GYN EXAM (GENERAL) (ROUTINE) W/O ABN FINDINGS: Primary | ICD-10-CM

## 2022-04-01 DIAGNOSIS — Z12.31 ENCOUNTER FOR SCREENING MAMMOGRAM FOR MALIGNANT NEOPLASM OF BREAST: ICD-10-CM

## 2022-04-01 PROCEDURE — 3008F BODY MASS INDEX DOCD: CPT | Performed by: OBSTETRICS & GYNECOLOGY

## 2022-04-01 PROCEDURE — 99396 PREV VISIT EST AGE 40-64: CPT | Performed by: OBSTETRICS & GYNECOLOGY

## 2022-04-01 PROCEDURE — 1036F TOBACCO NON-USER: CPT | Performed by: OBSTETRICS & GYNECOLOGY

## 2022-04-01 NOTE — PROGRESS NOTES
Assessment/Plan:     Diagnoses and all orders for this visit:    Encntr for gyn exam (general) (routine) w/o abn findings    Encounter for screening mammogram for malignant neoplasm of breast  -     Mammo screening bilateral w 3d & cad; Future         Subjective      Maria M Moore is a 63 y o  female who presents for annual exam  The patient has no complaints today  The patient is not sexually active  GYN screening history: pap 3/8/2021, neg/neg  Last mammo 10/21/2021, wnl  Last colonoscopy ,neg   The patient is not taking hormone replacement therapy  Patient denies post-menopausal vaginal bleeding  he denies any urinary complaints  Menstrual History:  OB History        3    Para   3    Term   3            AB        Living   3       SAB        IAB        Ectopic        Multiple        Live Births   3           Obstetric Comments   30, 31 and 28 yo (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[employer]; lives at home) boys-all doing well  No LMP recorded (lmp unknown)  Patient has had a hysterectomy       Past Medical History:   Diagnosis Date    Depression     Endometriosis     Fibromyalgia, primary     Osteoarthritis     Osteoporosis     Rheumatoid arthritis ([address] )     Scoliosis     Sjogren's syndrome ([facility] [address] )        Family History   Problem Relation Age of Onset    Hypertension Mother     Hypothyroidism Mother     Osteoporosis Mother     Pulmonary embolism Mother     Other Father         cardiac disorder    Lymphoma Father         chronic lymphoid leukemia    Emphysema Brother         lung    Osteoporosis Maternal Grandmother     Thyroid disease Maternal Grandmother     Transient ischemic attack Maternal Grandmother     Thyroid cancer Maternal Aunt     No Known Problems Sister     No Known Problems Son     No Known Problems Maternal Grandfather     No Known Problems Paternal Grandmother     No Known Problems Paternal Grandfather     No Known Problems Son    Miesha Greenwood Allergies Son        The following portions of the patient's history were reviewed and updated as appropriate: allergies, current medications, past family history, past medical history, past social history, past surgical history and problem list     Review of Systems  Pertinent items are noted in HPI  Objective      /72 (BP Location: Right arm, Patient Position: Sitting, Cuff Size: Standard)   Ht 5' 4 17" (1 63 m)   Wt 68 6 kg (151 lb 3 2 oz)   LMP  (LMP Unknown)   BMI 25 81 kg/m²     General:   alert and oriented, in no acute distress   Heart:    Breasts: regular rate and rhythm, S1, S2 normal, no murmur, click, rub or gallop   appear normal, no suspicious masses, no skin or nipple changes or axillary nodes     Lungs: clear to auscultation bilaterally   Abdomen: soft, non-tender, without masses or organomegaly   Vulva: normal   Vagina: normal mucosa   Cervix: no lesions   Uterus: surgically absent   Adnexa: no mass, fullness, tenderness

## 2022-04-23 ENCOUNTER — APPOINTMENT (OUTPATIENT)
Dept: LAB | Age: 62
End: 2022-04-23
Payer: COMMERCIAL

## 2022-04-23 DIAGNOSIS — R53.83 FATIGUE, UNSPECIFIED TYPE: ICD-10-CM

## 2022-04-23 DIAGNOSIS — Z79.899 ENCOUNTER FOR LONG-TERM (CURRENT) USE OF OTHER MEDICATIONS: ICD-10-CM

## 2022-04-23 DIAGNOSIS — M06.09 RHEUMATOID ARTHRITIS OF MULTIPLE SITES WITH NEGATIVE RHEUMATOID FACTOR (HCC): ICD-10-CM

## 2022-04-23 LAB — TSH SERPL DL<=0.05 MIU/L-ACNC: 0.77 UIU/ML (ref 0.45–4.5)

## 2022-04-23 PROCEDURE — 86800 THYROGLOBULIN ANTIBODY: CPT

## 2022-04-23 PROCEDURE — 84443 ASSAY THYROID STIM HORMONE: CPT

## 2022-04-23 PROCEDURE — 36415 COLL VENOUS BLD VENIPUNCTURE: CPT

## 2022-04-23 PROCEDURE — 86480 TB TEST CELL IMMUN MEASURE: CPT

## 2022-04-23 PROCEDURE — 86376 MICROSOMAL ANTIBODY EACH: CPT

## 2022-04-24 LAB — THYROPEROXIDASE AB SERPL-ACNC: <8 IU/ML (ref 0–34)

## 2022-04-25 LAB
GAMMA INTERFERON BACKGROUND BLD IA-ACNC: 0.03 IU/ML
M TB IFN-G BLD-IMP: NEGATIVE
M TB IFN-G CD4+ BCKGRND COR BLD-ACNC: 0 IU/ML
M TB IFN-G CD4+ BCKGRND COR BLD-ACNC: 0 IU/ML
MITOGEN IGNF BCKGRD COR BLD-ACNC: >10 IU/ML

## 2022-04-26 LAB — THYROGLOB AB SERPL-ACNC: <1 IU/ML (ref 0–0.9)

## 2022-07-30 ENCOUNTER — LAB (OUTPATIENT)
Dept: LAB | Age: 62
End: 2022-07-30
Payer: COMMERCIAL

## 2022-07-30 DIAGNOSIS — M06.09 RHEUMATOID ARTHRITIS OF MULTIPLE SITES WITH NEGATIVE RHEUMATOID FACTOR (HCC): ICD-10-CM

## 2022-07-30 LAB
ALBUMIN SERPL BCP-MCNC: 3.4 G/DL (ref 3.5–5)
ALP SERPL-CCNC: 47 U/L (ref 46–116)
ALT SERPL W P-5'-P-CCNC: 24 U/L (ref 12–78)
ANION GAP SERPL CALCULATED.3IONS-SCNC: 2 MMOL/L (ref 4–13)
AST SERPL W P-5'-P-CCNC: 21 U/L (ref 5–45)
BACTERIA UR QL AUTO: ABNORMAL /HPF
BASOPHILS # BLD AUTO: 0.04 THOUSANDS/ΜL (ref 0–0.1)
BASOPHILS NFR BLD AUTO: 1 % (ref 0–1)
BILIRUB SERPL-MCNC: 0.32 MG/DL (ref 0.2–1)
BILIRUB UR QL STRIP: NEGATIVE
BUN SERPL-MCNC: 8 MG/DL (ref 5–25)
CALCIUM ALBUM COR SERPL-MCNC: 9.4 MG/DL (ref 8.3–10.1)
CALCIUM SERPL-MCNC: 8.9 MG/DL (ref 8.3–10.1)
CHLORIDE SERPL-SCNC: 109 MMOL/L (ref 96–108)
CLARITY UR: CLEAR
CO2 SERPL-SCNC: 30 MMOL/L (ref 21–32)
COLOR UR: COLORLESS
CREAT SERPL-MCNC: 0.86 MG/DL (ref 0.6–1.3)
CRP SERPL QL: <3 MG/L
EOSINOPHIL # BLD AUTO: 0.16 THOUSAND/ΜL (ref 0–0.61)
EOSINOPHIL NFR BLD AUTO: 4 % (ref 0–6)
ERYTHROCYTE [DISTWIDTH] IN BLOOD BY AUTOMATED COUNT: 15.3 % (ref 11.6–15.1)
ERYTHROCYTE [SEDIMENTATION RATE] IN BLOOD: 15 MM/HOUR (ref 0–29)
GFR SERPL CREATININE-BSD FRML MDRD: 74 ML/MIN/1.73SQ M
GLUCOSE SERPL-MCNC: 93 MG/DL (ref 65–140)
GLUCOSE UR STRIP-MCNC: NEGATIVE MG/DL
HCT VFR BLD AUTO: 39.7 % (ref 34.8–46.1)
HGB BLD-MCNC: 12.6 G/DL (ref 11.5–15.4)
HGB UR QL STRIP.AUTO: NEGATIVE
IMM GRANULOCYTES # BLD AUTO: 0.01 THOUSAND/UL (ref 0–0.2)
IMM GRANULOCYTES NFR BLD AUTO: 0 % (ref 0–2)
KETONES UR STRIP-MCNC: NEGATIVE MG/DL
LEUKOCYTE ESTERASE UR QL STRIP: ABNORMAL
LYMPHOCYTES # BLD AUTO: 1.54 THOUSANDS/ΜL (ref 0.6–4.47)
LYMPHOCYTES NFR BLD AUTO: 42 % (ref 14–44)
MCH RBC QN AUTO: 31.3 PG (ref 26.8–34.3)
MCHC RBC AUTO-ENTMCNC: 31.7 G/DL (ref 31.4–37.4)
MCV RBC AUTO: 99 FL (ref 82–98)
MONOCYTES # BLD AUTO: 0.68 THOUSAND/ΜL (ref 0.17–1.22)
MONOCYTES NFR BLD AUTO: 19 % (ref 4–12)
NEUTROPHILS # BLD AUTO: 1.25 THOUSANDS/ΜL (ref 1.85–7.62)
NEUTS SEG NFR BLD AUTO: 34 % (ref 43–75)
NITRITE UR QL STRIP: NEGATIVE
NON-SQ EPI CELLS URNS QL MICRO: ABNORMAL /HPF
NRBC BLD AUTO-RTO: 0 /100 WBCS
PH UR STRIP.AUTO: 7 [PH]
PLATELET # BLD AUTO: 241 THOUSANDS/UL (ref 149–390)
PMV BLD AUTO: 11 FL (ref 8.9–12.7)
POTASSIUM SERPL-SCNC: 4.2 MMOL/L (ref 3.5–5.3)
PROT SERPL-MCNC: 7.2 G/DL (ref 6.4–8.4)
PROT UR STRIP-MCNC: NEGATIVE MG/DL
RBC # BLD AUTO: 4.03 MILLION/UL (ref 3.81–5.12)
RBC #/AREA URNS AUTO: ABNORMAL /HPF
SODIUM SERPL-SCNC: 141 MMOL/L (ref 135–147)
SP GR UR STRIP.AUTO: 1 (ref 1–1.03)
UROBILINOGEN UR STRIP-ACNC: <2 MG/DL
WBC # BLD AUTO: 3.68 THOUSAND/UL (ref 4.31–10.16)
WBC #/AREA URNS AUTO: ABNORMAL /HPF

## 2022-07-30 PROCEDURE — 80053 COMPREHEN METABOLIC PANEL: CPT

## 2022-07-30 PROCEDURE — 81001 URINALYSIS AUTO W/SCOPE: CPT

## 2022-07-30 PROCEDURE — 86140 C-REACTIVE PROTEIN: CPT

## 2022-07-30 PROCEDURE — 85652 RBC SED RATE AUTOMATED: CPT

## 2022-07-30 PROCEDURE — 36415 COLL VENOUS BLD VENIPUNCTURE: CPT

## 2022-07-30 PROCEDURE — 85025 COMPLETE CBC W/AUTO DIFF WBC: CPT

## 2022-09-12 ENCOUNTER — APPOINTMENT (OUTPATIENT)
Dept: LAB | Facility: CLINIC | Age: 62
End: 2022-09-12
Payer: COMMERCIAL

## 2022-09-12 DIAGNOSIS — D72.819 LEUKOPENIA, UNSPECIFIED TYPE: ICD-10-CM

## 2022-09-12 LAB
BASOPHILS # BLD AUTO: 0.04 THOUSANDS/ΜL (ref 0–0.1)
BASOPHILS NFR BLD AUTO: 1 % (ref 0–1)
EOSINOPHIL # BLD AUTO: 0.18 THOUSAND/ΜL (ref 0–0.61)
EOSINOPHIL NFR BLD AUTO: 3 % (ref 0–6)
ERYTHROCYTE [DISTWIDTH] IN BLOOD BY AUTOMATED COUNT: 15.6 % (ref 11.6–15.1)
HCT VFR BLD AUTO: 39.1 % (ref 34.8–46.1)
HGB BLD-MCNC: 12.5 G/DL (ref 11.5–15.4)
IMM GRANULOCYTES # BLD AUTO: 0.01 THOUSAND/UL (ref 0–0.2)
IMM GRANULOCYTES NFR BLD AUTO: 0 % (ref 0–2)
LYMPHOCYTES # BLD AUTO: 2.62 THOUSANDS/ΜL (ref 0.6–4.47)
LYMPHOCYTES NFR BLD AUTO: 47 % (ref 14–44)
MCH RBC QN AUTO: 31.7 PG (ref 26.8–34.3)
MCHC RBC AUTO-ENTMCNC: 32 G/DL (ref 31.4–37.4)
MCV RBC AUTO: 99 FL (ref 82–98)
MONOCYTES # BLD AUTO: 0.58 THOUSAND/ΜL (ref 0.17–1.22)
MONOCYTES NFR BLD AUTO: 11 % (ref 4–12)
NEUTROPHILS # BLD AUTO: 2.07 THOUSANDS/ΜL (ref 1.85–7.62)
NEUTS SEG NFR BLD AUTO: 38 % (ref 43–75)
NRBC BLD AUTO-RTO: 0 /100 WBCS
PLATELET # BLD AUTO: 256 THOUSANDS/UL (ref 149–390)
PMV BLD AUTO: 10.6 FL (ref 8.9–12.7)
RBC # BLD AUTO: 3.94 MILLION/UL (ref 3.81–5.12)
WBC # BLD AUTO: 5.5 THOUSAND/UL (ref 4.31–10.16)

## 2022-09-12 PROCEDURE — 36415 COLL VENOUS BLD VENIPUNCTURE: CPT

## 2022-09-12 PROCEDURE — 85025 COMPLETE CBC W/AUTO DIFF WBC: CPT

## 2022-10-27 ENCOUNTER — HOSPITAL ENCOUNTER (OUTPATIENT)
Dept: RADIOLOGY | Age: 62
Discharge: HOME | End: 2022-10-27
Payer: COMMERCIAL

## 2022-10-27 VITALS — BODY MASS INDEX: 24.92 KG/M2 | WEIGHT: 146 LBS | HEIGHT: 64 IN

## 2022-10-27 DIAGNOSIS — Z12.31 ENCOUNTER FOR SCREENING MAMMOGRAM FOR MALIGNANT NEOPLASM OF BREAST: ICD-10-CM

## 2022-10-27 PROCEDURE — 77063 BREAST TOMOSYNTHESIS BI: CPT

## 2022-10-27 PROCEDURE — 77067 SCR MAMMO BI INCL CAD: CPT

## 2023-02-11 ENCOUNTER — APPOINTMENT (OUTPATIENT)
Dept: LAB | Age: 63
End: 2023-02-11

## 2023-02-11 DIAGNOSIS — E55.9 VITAMIN D DEFICIENCY: ICD-10-CM

## 2023-02-11 DIAGNOSIS — M06.09 RHEUMATOID ARTHRITIS OF MULTIPLE SITES WITH NEGATIVE RHEUMATOID FACTOR (HCC): ICD-10-CM

## 2023-02-11 DIAGNOSIS — Z79.899 ENCOUNTER FOR LONG-TERM (CURRENT) USE OF OTHER MEDICATIONS: ICD-10-CM

## 2023-02-11 LAB
25(OH)D3 SERPL-MCNC: 64 NG/ML (ref 30–100)
ALBUMIN SERPL BCP-MCNC: 3.4 G/DL (ref 3.5–5)
ALP SERPL-CCNC: 49 U/L (ref 46–116)
ALT SERPL W P-5'-P-CCNC: 24 U/L (ref 12–78)
ANION GAP SERPL CALCULATED.3IONS-SCNC: 3 MMOL/L (ref 4–13)
AST SERPL W P-5'-P-CCNC: 22 U/L (ref 5–45)
BACTERIA UR QL AUTO: NORMAL /HPF
BASOPHILS # BLD AUTO: 0.05 THOUSANDS/ÂΜL (ref 0–0.1)
BASOPHILS NFR BLD AUTO: 1 % (ref 0–1)
BILIRUB SERPL-MCNC: 0.34 MG/DL (ref 0.2–1)
BILIRUB UR QL STRIP: NEGATIVE
BUN SERPL-MCNC: 11 MG/DL (ref 5–25)
CALCIUM ALBUM COR SERPL-MCNC: 10.4 MG/DL (ref 8.3–10.1)
CALCIUM SERPL-MCNC: 9.9 MG/DL (ref 8.3–10.1)
CHLORIDE SERPL-SCNC: 105 MMOL/L (ref 96–108)
CLARITY UR: CLEAR
CO2 SERPL-SCNC: 31 MMOL/L (ref 21–32)
COLOR UR: COLORLESS
CREAT SERPL-MCNC: 0.78 MG/DL (ref 0.6–1.3)
CRP SERPL QL: <3 MG/L
EOSINOPHIL # BLD AUTO: 0.17 THOUSAND/ÂΜL (ref 0–0.61)
EOSINOPHIL NFR BLD AUTO: 3 % (ref 0–6)
ERYTHROCYTE [DISTWIDTH] IN BLOOD BY AUTOMATED COUNT: 14.2 % (ref 11.6–15.1)
ERYTHROCYTE [SEDIMENTATION RATE] IN BLOOD: 19 MM/HOUR (ref 0–29)
GFR SERPL CREATININE-BSD FRML MDRD: 82 ML/MIN/1.73SQ M
GLUCOSE SERPL-MCNC: 89 MG/DL (ref 65–140)
GLUCOSE UR STRIP-MCNC: NEGATIVE MG/DL
HCT VFR BLD AUTO: 41.5 % (ref 34.8–46.1)
HGB BLD-MCNC: 13.6 G/DL (ref 11.5–15.4)
HGB UR QL STRIP.AUTO: NEGATIVE
IMM GRANULOCYTES # BLD AUTO: 0.01 THOUSAND/UL (ref 0–0.2)
IMM GRANULOCYTES NFR BLD AUTO: 0 % (ref 0–2)
KETONES UR STRIP-MCNC: NEGATIVE MG/DL
LEUKOCYTE ESTERASE UR QL STRIP: NEGATIVE
LYMPHOCYTES # BLD AUTO: 2.65 THOUSANDS/ÂΜL (ref 0.6–4.47)
LYMPHOCYTES NFR BLD AUTO: 45 % (ref 14–44)
MCH RBC QN AUTO: 31.9 PG (ref 26.8–34.3)
MCHC RBC AUTO-ENTMCNC: 32.8 G/DL (ref 31.4–37.4)
MCV RBC AUTO: 97 FL (ref 82–98)
MONOCYTES # BLD AUTO: 0.8 THOUSAND/ÂΜL (ref 0.17–1.22)
MONOCYTES NFR BLD AUTO: 14 % (ref 4–12)
NEUTROPHILS # BLD AUTO: 2.2 THOUSANDS/ÂΜL (ref 1.85–7.62)
NEUTS SEG NFR BLD AUTO: 37 % (ref 43–75)
NITRITE UR QL STRIP: NEGATIVE
NON-SQ EPI CELLS URNS QL MICRO: NORMAL /HPF
NRBC BLD AUTO-RTO: 0 /100 WBCS
PH UR STRIP.AUTO: 7.5 [PH]
PLATELET # BLD AUTO: 281 THOUSANDS/UL (ref 149–390)
PMV BLD AUTO: 10.2 FL (ref 8.9–12.7)
POTASSIUM SERPL-SCNC: 4.3 MMOL/L (ref 3.5–5.3)
PROT SERPL-MCNC: 7.3 G/DL (ref 6.4–8.4)
PROT UR STRIP-MCNC: NEGATIVE MG/DL
RBC # BLD AUTO: 4.27 MILLION/UL (ref 3.81–5.12)
RBC #/AREA URNS AUTO: NORMAL /HPF
SODIUM SERPL-SCNC: 139 MMOL/L (ref 135–147)
SP GR UR STRIP.AUTO: 1.01 (ref 1–1.03)
UROBILINOGEN UR STRIP-ACNC: <2 MG/DL
WBC # BLD AUTO: 5.88 THOUSAND/UL (ref 4.31–10.16)
WBC #/AREA URNS AUTO: NORMAL /HPF

## 2023-02-16 PROBLEM — R20.2 PARESTHESIAS: Status: ACTIVE | Noted: 2023-02-16

## 2023-02-16 PROBLEM — M67.441 GANGLION CYST OF FINGER OF RIGHT HAND: Status: ACTIVE | Noted: 2023-02-16

## 2023-02-20 ENCOUNTER — APPOINTMENT (OUTPATIENT)
Dept: RADIOLOGY | Age: 63
End: 2023-02-20

## 2023-02-20 ENCOUNTER — OFFICE VISIT (OUTPATIENT)
Dept: OBGYN CLINIC | Facility: CLINIC | Age: 63
End: 2023-02-20

## 2023-02-20 VITALS
SYSTOLIC BLOOD PRESSURE: 125 MMHG | HEIGHT: 64 IN | BODY MASS INDEX: 25.61 KG/M2 | DIASTOLIC BLOOD PRESSURE: 81 MMHG | HEART RATE: 76 BPM | WEIGHT: 150 LBS

## 2023-02-20 DIAGNOSIS — M79.644 FINGER PAIN, RIGHT: ICD-10-CM

## 2023-02-20 DIAGNOSIS — M79.644 FINGER PAIN, RIGHT: Primary | ICD-10-CM

## 2023-02-20 DIAGNOSIS — M67.441 GANGLION CYST OF FINGER OF RIGHT HAND: ICD-10-CM

## 2023-02-20 DIAGNOSIS — M67.441 GANGLION, FINGER OF RIGHT HAND: ICD-10-CM

## 2023-02-20 NOTE — PATIENT INSTRUCTIONS
Ganglion Cyst   WHAT YOU NEED TO KNOW:   A ganglion cyst is an abnormal buildup of fluid under the skin  They are most common on the wrists, feet, or ankles, but can be found anywhere on the body  The cause is not known  You may have a higher risk for a ganglion cyst if you injure your joint  DISCHARGE INSTRUCTIONS:   Call your doctor or orthopedist if:   You continue to have pain, even after treatment  Your cyst returns or gets larger  Your limb that has the cyst gets weak, numb, stiff, or unstable  You have questions or concerns about your condition or care  Manage a ganglion cyst:   Do not try to pop or break the cyst   This can cause tissue damage and the cyst may return  Go to hand therapy, if needed  A hand therapist teaches you exercises to help improve movement and strength, and to decrease pain  Wear a splint as directed  to support and protect the joint that has the cyst  This will limit movement and help your cyst get smaller  Follow up with your doctor or orthopedist as directed:  Write down your questions so you remember to ask them during your visits  © Copyright [employer] 2022 Information is for End User's use only and may not be sold, redistributed or otherwise used for commercial purposes  The above information is an  only  It is not intended as medical advice for individual conditions or treatments  Talk to your doctor, nurse or pharmacist before following any medical regimen to see if it is safe and effective for you

## 2023-02-20 NOTE — PROGRESS NOTES
Orthopaedic Surgery - Office Note  Kadeem Hinds (31 y o  female)   : 1960   MRN: [id number]  Encounter Date: 2023    Chief Complaint   Patient presents with   • Right Hand - Pain         Assessment/Plan  Diagnoses and all orders for this visit:    Finger pain, right  -     XR finger right second digit-index; Future    Ganglion, finger of right hand    Ganglion cyst of finger of right hand  -     Ambulatory Referral to Hand Surgery    Other orders  -     Hand/upper extremity injection    The diagnosis was discussed with the patient in the office today  Risks and benefits of a attempted aspiration were reviewed at length especially her increased risk of infection with a contributing medical history and medications  She would like to have the cyst attempted to be decompressed and does not wish to consider surgery at this time  I reviewed with the patient it appears to be a ganglion cyst differential would include a giant cell tumor which is felt less likely at this time  Patient tolerated the cyst decompression well with fluid being removed  Patient will keep pressure over top of the cyst with a Band-Aid for the next 24 hours  She will monitor the symptoms and return with hand surgeon if they recur  Return if symptoms worsen or fail to improve-with Dr Darren Isbell  History of Present Illness  This is a new patient with right index finger ganglion cyst at the DIP joint  Patient has a contributing medical history of rheumatoid arthritis, Sjogrens, and fibromyalgia  She is on methotrexate, Humira, Lyrica, Cymbalta  She also has a history of osteoporosis and paresthesias in her hands  She is here today for the ganglion cyst in her DIP joint of the right index finger  Patient reports that the cyst is bothering her and becoming a little bit more painful  It has been there for at least a year  She denies any fever or chills  She reports she would like to have it attempted to be drained  She reports that working on the computer seems to aggravate it  Review of Systems  Pertinent items are noted in HPI  All other systems were reviewed and are negative  Physical Exam  /81 (BP Location: Left arm, Patient Position: Sitting, Cuff Size: Standard)   Pulse 76   Ht 5' 4" (1 626 m)   Wt 68 kg (150 lb)   LMP  (LMP Unknown)   BMI 25 75 kg/m²   Cons: Appears well  No apparent distress  Psych: Alert  Oriented x3  Mood and affect normal   Eyes: PERRLA, EOMI  Resp: Normal effort  No audible wheezing or stridor  CV: Palpable pulse  No discernable arrhythmia  Lymph:  No palpable cervical, axillary, or inguinal lymphadenopathy  Skin: Warm  No palpable masses  No visible lesions  Neuro: Normal muscle tone  Normal and symmetric DTR's  Patient's right index finger has a small ganglion cyst on the dorsal surface of the DIP joint  It is mildly tender to palpation and appears fluid-filled  She has full range of motion of the DIP joint to flexion and extension  It appears there is early pressure on the nail matrix  No deformity is appreciated at the nailbed or nail  She is neurovascular intact at the distal digit  She has full range of motion of the PIP and MCP joint        Studies Reviewed  Rheumatology notes were reviewed by myself in the office today  X-rays performed in the office today 3 views of the right index finger show no acute fractures or dislocations  No bony erosions are noted at the DIP joint  Soft tissue masses appreciated on the dorsal surface of the DIP joint consistent with a ganglion cyst location  X-rays were reviewed from an orthopedic standpoint will await official radiologist interpretation    Hand/upper extremity injection  Universal Protocol:  Consent: Verbal consent obtained    Risks and benefits: risks, benefits and alternatives were discussed  Consent given by: patient  Time out: Immediately prior to procedure a "time out" was called to verify the correct patient, procedure, equipment, support staff and site/side marked as required  Patient understanding: patient states understanding of the procedure being performed  Patient consent: the patient's understanding of the procedure matches consent given  Relevant documents: relevant documents present and verified  Test results: test results available and properly labeled  Site marked: the operative site was marked  Radiology Images displayed and confirmed  If images not available, report reviewed: imaging studies available  Patient identity confirmed: verbally with patient    Supporting Documentation  Indications: Ganglion cyst    Procedure Details  Condition comment: Right index finger dorsal DIP ganglion cyst   Preparation: Patient was prepped and draped in the usual sterile fashion  Needle size: 18 G  Ultrasound guidance: no  Aspirate amount: 0 5 mL  Aspirate: clear (Gelatinous)    Patient tolerance: patient tolerated the procedure well with no immediate complications  Dressing:  Sterile dressing applied         Medical, Surgical, Family, and Social History  The patient's medical history, family history, and social history, were reviewed and updated as appropriate      Past Medical History:   Diagnosis Date   • Depression    • Endometriosis    • Fibromyalgia, primary    • Osteoarthritis    • Osteoporosis    • Rheumatoid arthritis ([facility] [address] )    • Scoliosis    • Sjogren's syndrome [facility])        Past Surgical History:   Procedure Laterality Date   • COLONOSCOPY  2016, 2018   • OOPHORECTOMY     • TONSILLECTOMY     • TOOTH EXTRACTION     • TOTAL ABDOMINAL HYSTERECTOMY  2005    at age 45       Family History   Problem Relation Age of Onset   • Hypertension Mother    • Hypothyroidism Mother    • Osteoporosis Mother    • Pulmonary embolism Mother    • Other Father         cardiac disorder   • Lymphoma Father         chronic lymphoid leukemia   • Emphysema Brother         lung   • Osteoporosis Maternal Grandmother    • Thyroid disease Maternal Grandmother    • Transient ischemic attack Maternal Grandmother    • Thyroid cancer Maternal Aunt    • No Known Problems Sister    • No Known Problems Son    • No Known Problems Maternal Grandfather    • No Known Problems Paternal Grandmother    • No Known Problems Paternal Grandfather    • No Known Problems Son    • Allergies Son        Social History     Occupational History   • Occupation: works for [employer], [employer]     Comment: FT, traveleld to [employer]   • Occupation: full-time employment   Tobacco Use   • Smoking status: Never   • Smokeless tobacco: Never   Vaping Use   • Vaping Use: Never used   Substance and Sexual Activity   • Alcohol use:  Yes   • Drug use: No   • Sexual activity: Not Currently     Partners: Male     Birth control/protection: None, Surgical     Comment: x 33 years       No Known Allergies      Current Outpatient Medications:   •  Cholecalciferol (VITAMIN D) 2000 units tablet, Take 2 tablets by mouth daily, Disp: , Rfl:   •  DULoxetine (CYMBALTA) 60 mg delayed release capsule, Take 60 mg by mouth 2 (two) times a day , Disp: , Rfl:   •  eszopiclone (LUNESTA) 3 MG tablet, Take 3 mg by mouth daily at bedtime, Disp: , Rfl:   •  folic acid (FOLVITE) 1 mg tablet, Take 1 tablet (1 mg total) by mouth daily, Disp: 30 tablet, Rfl: 5  •  Humira Pen 40 MG/0 4ML PNKT, INJECT 1 PEN UNDER THE SKIN EVERY 14 DAYS , Disp: 6 each, Rfl: 0  •  Magnesium 200 MG TABS, Take 1 tablet by mouth daily, Disp: , Rfl:   •  methotrexate 2 5 mg tablet, Take 8 tablets (20 mg total) by mouth once a week, Disp: 96 tablet, Rfl: 1  •  pregabalin (LYRICA) 100 mg capsule, Take 1 capsule by mouth 2 (two) times a day, Disp: , Rfl:   •  Sodium Fluoride (PreviDent 5000 Booster Plus) 1 1 % PSTE, Apply on teeth every evening as directed, Disp: 100 mL, Rfl: 3  •  Turmeric 450 MG CAPS, Take 1 capsule by mouth 2 (two) times a day, Disp: , Rfl:   •  cloNIDine (CATAPRES) 0 1 mg tablet, Take 0 1 mg by mouth once , Disp: , Rfl:   •  zoledronic acid (RECLAST) 5 mg/100 mL IV infusion (premix), Infuse 100 mL (5 mg total) into a venous catheter once for 1 dose, Disp: 100 mL, Rfl: 0      Bigg Smith PA-C

## 2023-02-25 ENCOUNTER — APPOINTMENT (OUTPATIENT)
Dept: LAB | Age: 63
End: 2023-02-25

## 2023-02-25 DIAGNOSIS — E83.52 HYPERCALCEMIA: ICD-10-CM

## 2023-02-25 LAB
CA-I BLD-SCNC: 1.23 MMOL/L (ref 1.12–1.32)
CALCIUM SERPL-MCNC: 9.9 MG/DL (ref 8.3–10.1)
PTH-INTACT SERPL-MCNC: 21 PG/ML (ref 18.4–80.1)

## 2023-06-17 ENCOUNTER — APPOINTMENT (OUTPATIENT)
Dept: LAB | Age: 63
End: 2023-06-17
Payer: COMMERCIAL

## 2023-06-17 DIAGNOSIS — M06.09 RHEUMATOID ARTHRITIS OF MULTIPLE SITES WITH NEGATIVE RHEUMATOID FACTOR (HCC): ICD-10-CM

## 2023-06-17 DIAGNOSIS — Z79.899 ENCOUNTER FOR LONG-TERM (CURRENT) USE OF OTHER MEDICATIONS: ICD-10-CM

## 2023-06-17 LAB
ALBUMIN SERPL BCP-MCNC: 3.3 G/DL (ref 3.5–5)
ALP SERPL-CCNC: 52 U/L (ref 46–116)
ALT SERPL W P-5'-P-CCNC: 29 U/L (ref 12–78)
ANION GAP SERPL CALCULATED.3IONS-SCNC: -1 MMOL/L (ref 4–13)
AST SERPL W P-5'-P-CCNC: 21 U/L (ref 5–45)
BACTERIA UR QL AUTO: NORMAL /HPF
BASOPHILS # BLD AUTO: 0.06 THOUSANDS/ÂΜL (ref 0–0.1)
BASOPHILS NFR BLD AUTO: 2 % (ref 0–1)
BILIRUB SERPL-MCNC: 0.27 MG/DL (ref 0.2–1)
BILIRUB UR QL STRIP: NEGATIVE
BUN SERPL-MCNC: 12 MG/DL (ref 5–25)
CALCIUM ALBUM COR SERPL-MCNC: 10.2 MG/DL (ref 8.3–10.1)
CALCIUM SERPL-MCNC: 9.6 MG/DL (ref 8.3–10.1)
CHLORIDE SERPL-SCNC: 109 MMOL/L (ref 96–108)
CLARITY UR: CLEAR
CO2 SERPL-SCNC: 32 MMOL/L (ref 21–32)
COLOR UR: COLORLESS
CREAT SERPL-MCNC: 0.77 MG/DL (ref 0.6–1.3)
CRP SERPL QL: <3 MG/L
EOSINOPHIL # BLD AUTO: 0.11 THOUSAND/ÂΜL (ref 0–0.61)
EOSINOPHIL NFR BLD AUTO: 3 % (ref 0–6)
ERYTHROCYTE [DISTWIDTH] IN BLOOD BY AUTOMATED COUNT: 14.7 % (ref 11.6–15.1)
ERYTHROCYTE [SEDIMENTATION RATE] IN BLOOD: 11 MM/HOUR (ref 0–29)
GFR SERPL CREATININE-BSD FRML MDRD: 84 ML/MIN/1.73SQ M
GLUCOSE SERPL-MCNC: 80 MG/DL (ref 65–140)
GLUCOSE UR STRIP-MCNC: NEGATIVE MG/DL
HCT VFR BLD AUTO: 39.6 % (ref 34.8–46.1)
HGB BLD-MCNC: 12.9 G/DL (ref 11.5–15.4)
HGB UR QL STRIP.AUTO: NEGATIVE
IMM GRANULOCYTES # BLD AUTO: 0.01 THOUSAND/UL (ref 0–0.2)
IMM GRANULOCYTES NFR BLD AUTO: 0 % (ref 0–2)
KETONES UR STRIP-MCNC: NEGATIVE MG/DL
LEUKOCYTE ESTERASE UR QL STRIP: NEGATIVE
LYMPHOCYTES # BLD AUTO: 1.71 THOUSANDS/ÂΜL (ref 0.6–4.47)
LYMPHOCYTES NFR BLD AUTO: 44 % (ref 14–44)
MCH RBC QN AUTO: 32.4 PG (ref 26.8–34.3)
MCHC RBC AUTO-ENTMCNC: 32.6 G/DL (ref 31.4–37.4)
MCV RBC AUTO: 100 FL (ref 82–98)
MONOCYTES # BLD AUTO: 0.71 THOUSAND/ÂΜL (ref 0.17–1.22)
MONOCYTES NFR BLD AUTO: 18 % (ref 4–12)
NEUTROPHILS # BLD AUTO: 1.26 THOUSANDS/ÂΜL (ref 1.85–7.62)
NEUTS SEG NFR BLD AUTO: 33 % (ref 43–75)
NITRITE UR QL STRIP: NEGATIVE
NON-SQ EPI CELLS URNS QL MICRO: NORMAL /HPF
NRBC BLD AUTO-RTO: 0 /100 WBCS
PH UR STRIP.AUTO: 7.5 [PH]
PLATELET # BLD AUTO: 286 THOUSANDS/UL (ref 149–390)
PMV BLD AUTO: 10.4 FL (ref 8.9–12.7)
POTASSIUM SERPL-SCNC: 4 MMOL/L (ref 3.5–5.3)
PROT SERPL-MCNC: 7.2 G/DL (ref 6.4–8.4)
PROT UR STRIP-MCNC: NEGATIVE MG/DL
RBC # BLD AUTO: 3.98 MILLION/UL (ref 3.81–5.12)
RBC #/AREA URNS AUTO: NORMAL /HPF
SODIUM SERPL-SCNC: 140 MMOL/L (ref 135–147)
SP GR UR STRIP.AUTO: 1 (ref 1–1.03)
UROBILINOGEN UR STRIP-ACNC: <2 MG/DL
WBC # BLD AUTO: 3.86 THOUSAND/UL (ref 4.31–10.16)
WBC #/AREA URNS AUTO: NORMAL /HPF

## 2023-06-17 PROCEDURE — 85652 RBC SED RATE AUTOMATED: CPT

## 2023-06-17 PROCEDURE — 80053 COMPREHEN METABOLIC PANEL: CPT

## 2023-06-17 PROCEDURE — 85025 COMPLETE CBC W/AUTO DIFF WBC: CPT

## 2023-06-17 PROCEDURE — 36415 COLL VENOUS BLD VENIPUNCTURE: CPT

## 2023-06-17 PROCEDURE — 81001 URINALYSIS AUTO W/SCOPE: CPT

## 2023-06-17 PROCEDURE — 86140 C-REACTIVE PROTEIN: CPT

## 2023-10-31 ENCOUNTER — APPOINTMENT (OUTPATIENT)
Dept: RADIOLOGY | Age: 63
End: 2023-10-31
Payer: COMMERCIAL

## 2023-10-31 ENCOUNTER — HOSPITAL ENCOUNTER (OUTPATIENT)
Dept: NEUROLOGY | Facility: CLINIC | Age: 63
Discharge: HOME | End: 2023-10-31
Payer: COMMERCIAL

## 2023-10-31 DIAGNOSIS — R20.2 PARESTHESIAS: ICD-10-CM

## 2023-10-31 PROCEDURE — 95911 NRV CNDJ TEST 9-10 STUDIES: CPT | Performed by: PSYCHIATRY & NEUROLOGY

## 2023-10-31 PROCEDURE — 95886 MUSC TEST DONE W/N TEST COMP: CPT | Performed by: PSYCHIATRY & NEUROLOGY

## 2023-11-07 ENCOUNTER — APPOINTMENT (OUTPATIENT)
Dept: LAB | Facility: CLINIC | Age: 63
End: 2023-11-07
Payer: COMMERCIAL

## 2023-11-07 DIAGNOSIS — M06.09 RHEUMATOID ARTHRITIS OF MULTIPLE SITES WITH NEGATIVE RHEUMATOID FACTOR (HCC): ICD-10-CM

## 2023-11-07 DIAGNOSIS — E83.52 HYPERCALCEMIA: ICD-10-CM

## 2023-11-07 DIAGNOSIS — Z79.899 ENCOUNTER FOR LONG-TERM (CURRENT) USE OF OTHER MEDICATIONS: ICD-10-CM

## 2023-11-07 PROCEDURE — 86480 TB TEST CELL IMMUN MEASURE: CPT

## 2023-11-08 PROBLEM — M65.311 TRIGGER FINGER OF RIGHT THUMB: Status: ACTIVE | Noted: 2023-11-08

## 2023-11-08 PROBLEM — G56.01 CARPAL TUNNEL SYNDROME OF RIGHT WRIST: Status: ACTIVE | Noted: 2023-11-08

## 2023-11-08 LAB
GAMMA INTERFERON BACKGROUND BLD IA-ACNC: 0.02 IU/ML
M TB IFN-G BLD-IMP: NEGATIVE
M TB IFN-G CD4+ BCKGRND COR BLD-ACNC: 0.04 IU/ML
M TB IFN-G CD4+ BCKGRND COR BLD-ACNC: 0.05 IU/ML
MITOGEN IGNF BCKGRD COR BLD-ACNC: 9.98 IU/ML

## 2023-11-24 ENCOUNTER — OFFICE VISIT (OUTPATIENT)
Dept: OBGYN CLINIC | Facility: CLINIC | Age: 63
End: 2023-11-24
Payer: COMMERCIAL

## 2023-11-24 VITALS
SYSTOLIC BLOOD PRESSURE: 124 MMHG | HEIGHT: 64 IN | WEIGHT: 155 LBS | BODY MASS INDEX: 26.46 KG/M2 | DIASTOLIC BLOOD PRESSURE: 66 MMHG

## 2023-11-24 DIAGNOSIS — Z01.419 WOMEN'S ANNUAL ROUTINE GYNECOLOGICAL EXAMINATION: Primary | ICD-10-CM

## 2023-11-24 DIAGNOSIS — Z12.4 SCREENING FOR MALIGNANT NEOPLASM OF THE CERVIX: ICD-10-CM

## 2023-11-24 DIAGNOSIS — Z12.31 SCREENING MAMMOGRAM, ENCOUNTER FOR: ICD-10-CM

## 2023-11-24 DIAGNOSIS — Z11.51 SCREENING FOR HUMAN PAPILLOMAVIRUS: ICD-10-CM

## 2023-11-24 PROCEDURE — G0476 HPV COMBO ASSAY CA SCREEN: HCPCS | Performed by: OBSTETRICS & GYNECOLOGY

## 2023-11-24 PROCEDURE — G0145 SCR C/V CYTO,THINLAYER,RESCR: HCPCS | Performed by: OBSTETRICS & GYNECOLOGY

## 2023-11-24 PROCEDURE — S0612 ANNUAL GYNECOLOGICAL EXAMINA: HCPCS | Performed by: OBSTETRICS & GYNECOLOGY

## 2023-11-24 NOTE — PROGRESS NOTES
Subjective      Dhiraj Stokes is a 63 y.o. female who presents for annual well woman exam. 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 No LMP recorded (lmp unknown). Patient has had a hysterectomy. The following portions of the patient's history were reviewed and updated as appropriate: allergies, current medications, past family history, past medical history, past social history, past surgical history, and problem list.    Review of Systems  Review of Systems   Constitutional:  Negative for activity change, appetite change, chills, fatigue and fever. Respiratory:  Negative for apnea, cough, chest tightness and shortness of breath. Cardiovascular:  Negative for chest pain, palpitations and leg swelling. Gastrointestinal:  Negative for abdominal pain, constipation, diarrhea, nausea and vomiting. Genitourinary:  Negative for difficulty urinating, dysuria, flank pain, frequency, hematuria and urgency. Neurological:  Negative for dizziness, seizures, syncope, light-headedness, numbness and headaches. Psychiatric/Behavioral:  Negative for agitation and confusion.            Objective      /66 (BP Location: Left arm, Patient Position: Sitting, Cuff Size: Adult)   Ht 5' 4" (1.626 m)   Wt 70.3 kg (155 lb)   LMP  (LMP Unknown)   BMI 26.61 kg/m²     Physical Exam  OBGyn Exam     General:   alert and oriented, in no acute distress, alert, appears stated age, and cooperative   Heart: regular rate and rhythm, S1, S2 normal, no murmur, click, rub or gallop   Lungs: clear to auscultation bilaterally   Abdomen: soft, non-tender, without masses or organomegaly   Vulva: normal   Vagina: normal mucosa, normal discharge   Cervix: no cervical motion tenderness and no lesions   Uterus: surgically absent   Adnexa:  Breast Exam:  surgically absent  breasts appear normal, no suspicious masses, no skin or nipple changes or axillary nodes. Assessment      @well woman@ . 64 yo female   Annual exan   Supracervical hysterectomy with BSO in 2006 secondary to endometriosis  Osteoporosis   RA  N/A partner  Depression   Plan   Pap/HPV  Diet/exercise  Calcium/vitamin D  Kegel exercise secondary to mild KAYLYN  Mammogram  Osteoporosis is managed by PCP  Colonoscopy patient is follow-up with PCP doing Cologuard  Return to office for annual exam   All questions answered. There are no Patient Instructions on file for this visit.

## 2023-12-01 ENCOUNTER — OFFICE VISIT (OUTPATIENT)
Dept: INTERNAL MEDICINE CLINIC | Facility: CLINIC | Age: 63
End: 2023-12-01
Payer: COMMERCIAL

## 2023-12-01 VITALS
HEIGHT: 64 IN | OXYGEN SATURATION: 100 % | DIASTOLIC BLOOD PRESSURE: 88 MMHG | SYSTOLIC BLOOD PRESSURE: 130 MMHG | RESPIRATION RATE: 16 BRPM | HEART RATE: 79 BPM | BODY MASS INDEX: 26.29 KG/M2 | TEMPERATURE: 98.6 F | WEIGHT: 154 LBS

## 2023-12-01 DIAGNOSIS — Z12.12 SCREENING FOR COLORECTAL CANCER: ICD-10-CM

## 2023-12-01 DIAGNOSIS — Z00.00 ANNUAL PHYSICAL EXAM: Primary | ICD-10-CM

## 2023-12-01 DIAGNOSIS — Z23 ENCOUNTER FOR IMMUNIZATION: ICD-10-CM

## 2023-12-01 DIAGNOSIS — Z13.6 SCREENING FOR CARDIOVASCULAR CONDITION: ICD-10-CM

## 2023-12-01 DIAGNOSIS — Z12.11 SCREENING FOR COLORECTAL CANCER: ICD-10-CM

## 2023-12-01 PROBLEM — Z12.31 ENCOUNTER FOR SCREENING MAMMOGRAM FOR BREAST CANCER: Status: RESOLVED | Noted: 2018-10-22 | Resolved: 2023-12-01

## 2023-12-01 PROBLEM — M67.441 GANGLION CYST OF FINGER OF RIGHT HAND: Status: RESOLVED | Noted: 2023-02-16 | Resolved: 2023-12-01

## 2023-12-01 PROBLEM — L65.9 HAIR LOSS: Status: RESOLVED | Noted: 2022-02-17 | Resolved: 2023-12-01

## 2023-12-01 PROCEDURE — 99396 PREV VISIT EST AGE 40-64: CPT | Performed by: INTERNAL MEDICINE

## 2023-12-01 PROCEDURE — 90686 IIV4 VACC NO PRSV 0.5 ML IM: CPT | Performed by: INTERNAL MEDICINE

## 2023-12-01 PROCEDURE — 90677 PCV20 VACCINE IM: CPT | Performed by: INTERNAL MEDICINE

## 2023-12-01 PROCEDURE — 90471 IMMUNIZATION ADMIN: CPT | Performed by: INTERNAL MEDICINE

## 2023-12-01 PROCEDURE — 90472 IMMUNIZATION ADMIN EACH ADD: CPT | Performed by: INTERNAL MEDICINE

## 2023-12-01 NOTE — PROGRESS NOTES
ADULT ANNUAL [facility]    NAME: Dina Kelsey  AGE: 63 y.o. SEX: female  : 1960     DATE: 2023     Assessment and Plan:     Problem List Items Addressed This Visit    None  Visit Diagnoses     Annual physical exam    -  Primary    Screening for colorectal cancer        Relevant Orders    Ambulatory referral to Colorectal Surgery    Screening for cardiovascular condition        Relevant Orders    Lipid panel    Encounter for immunization        Relevant Orders    influenza vaccine, quadrivalent, 0.5 mL, preservative-free, for adult and pediatric patients 6 mos+ (AFLURIA, FLUARIX, FLULAVAL, FLUZONE) (Completed)    Pneumococcal Conjugate Vaccine 20-valent (Pcv20) (Completed)          Immunizations and preventive care screenings were discussed with patient today. Appropriate education was printed on patient's after visit summary. Counseling:  Alcohol/drug use: discussed moderation in alcohol intake, the recommendations for healthy alcohol use, and avoidance of illicit drug use. Dental Health: discussed importance of regular tooth brushing, flossing, and dental visits. Exercise: the importance of regular exercise/physical activity was discussed. Recommend exercise 3-5 times per week for at least 30 minutes. Immunizations discussed. Influenza and prevnar 20 vaccines due today. Consider updated COVID vaccine, RSV. Received one dose of shingrix. She is up to date with tdap. Cancer screenings discussed. PAP per GYN. Mammo is rescheduled. BMI Counseling: Body mass index is 26.43 kg/m². The BMI is above normal. Nutrition recommendations include decreasing portion sizes, limiting drinks that contain sugar, moderation in carbohydrate intake, increasing intake of lean protein and reducing intake of cholesterol.  Exercise recommendations include moderate physical activity 150 minutes/week, exercising 3-5 times per week and strength training exercises. No pharmacotherapy was ordered. Rationale for BMI follow-up plan is due to patient being overweight or obese. Return in about 1 year (around 2024) for Annual physical.     Chief Complaint:     Chief Complaint   Patient presents with   • Annual Exam      History of Present Illness:     Adult Annual Physical   Patient with RA, fibromyalgia, osteoporosis, OA, Sjogren's and vitamin D def here for a comprehensive physical exam. A new grandchild is expected next year. She has not scheduled her DXA yet. She completed reclast x3. Was diagnosed with CTS based on EMG. Diet and Physical Activity  Diet/Nutrition:  regular diet . Does not like to cook. Exercise:  [employer] class once weekly and pilates once weekly . Depression Screening  PHQ-2/9 Depression Screening    Little interest or pleasure in doing things: 0 - not at all  Feeling down, depressed, or hopeless: 0 - not at all  Trouble falling or staying asleep, or sleeping too much: 0 - not at all  Feeling tired or having little energy: 3 - nearly every day  Poor appetite or overeatin - not at all  Feeling bad about yourself - or that you are a failure or have let yourself or your family down: 0 - not at all  Trouble concentrating on things, such as reading the newspaper or watching television: 1 - several days  Moving or speaking so slowly that other people could have noticed. Or the opposite - being so fidgety or restless that you have been moving around a lot more than usual: 0 - not at all  Thoughts that you would be better off dead, or of hurting yourself in some way: 0 - not at all  PHQ-9 Score: 4   PHQ-9 Interpretation: No or Minimal depression        General Health  Sleep: sleeps well on Lunesta  Hearing: normal - bilateral.  Vision: most recent eye exam <1 year ago and wears glasses. Dental: regular dental visits. /GYN Health  Follows with gynecology?  yes   Patient is: postmenopausal  Last menstrual period:   Contraceptive method:  none . Advanced Care Planning  Do you have an advanced directive? no  Do you have a durable medical power of ? no     Review of Systems:     Review of Systems   Constitutional:  Positive for appetite change and unexpected weight change (weight gain). Negative for activity change. HENT:  Negative for congestion, rhinorrhea and sinus pain. Respiratory:  Negative for cough, chest tightness, shortness of breath and wheezing. Cardiovascular:  Negative for chest pain, palpitations and leg swelling. Gastrointestinal:  Positive for constipation. Negative for abdominal pain, diarrhea and vomiting. Heartburn   Genitourinary:  Negative for difficulty urinating and menstrual problem. Musculoskeletal:  Positive for arthralgias and joint swelling. Neurological:  Positive for dizziness (positional), numbness (wrist) and headaches (once weekly). Psychiatric/Behavioral:  Positive for dysphoric mood. Negative for sleep disturbance. The patient is not nervous/anxious.        Past Medical History:     Past Medical History:   Diagnosis Date   • COVID 2021   • Depression    • Encounter for screening mammogram for breast cancer 10/22/2018   • Endometriosis    • Fibromyalgia, primary    • Ganglion cyst of finger of right hand 02/16/2023   • Hair loss 02/17/2022   • Osteoarthritis    • Osteoporosis    • Rheumatoid arthritis ([address])    • Scoliosis    • Sjogren's syndrome [facility])       Past Surgical History:     Past Surgical History:   Procedure Laterality Date   • COLONOSCOPY  2016, 2018   • OOPHORECTOMY     • TONSILLECTOMY     • TOOTH EXTRACTION     • TOTAL ABDOMINAL HYSTERECTOMY  2005    at age 45      Social History:     Social History     Socioeconomic History   • Marital status: /Civil Union     Spouse name: None   • Number of children: 3   • Years of education: None   • Highest education level: None   Occupational History   • Occupation: works for [employer], creating propoals     Comment: [employer] traveleld to [facility]   • Occupation: full-time employment   Tobacco Use   • Smoking status: Never   • Smokeless tobacco: Never   Vaping Use   • Vaping Use: Never used   Substance and Sexual Activity   • Alcohol use: Yes     Comment: social   • Drug use: No   • Sexual activity: Not Currently     Partners: Male     Birth control/protection: None, Surgical     Comment: x 33 years   Other Topics Concern   • None   Social History Narrative    Daily caffeine consumption, 2-3 serving a day     Social Determinants of Health     Financial Resource Strain: Not on file   Food Insecurity: Not on file   Transportation Needs: Not on file   Physical Activity: Not on file   Stress: Not on file   Social Connections: Not on file   Intimate Partner Violence: Not on file   Housing Stability: Not on file      Family History:     Family History   Problem Relation Age of Onset   • Hypertension Mother    • Hypothyroidism Mother    • Osteoporosis Mother    • Pulmonary embolism Mother    • Other Father         cardiac disorder   • Lymphoma Father         chronic lymphoid leukemia   • No Known Problems Sister    • Emphysema Brother         lung   • No Known Problems Son    • No Known Problems Son    • Allergies Son    • Osteoporosis Maternal Grandmother    • Thyroid disease Maternal Grandmother    • Transient ischemic attack Maternal Grandmother    • No Known Problems Maternal Grandfather    • No Known Problems Paternal Grandmother    • No Known Problems Paternal Grandfather    • Thyroid cancer Maternal Aunt       Current Medications:     Current Outpatient Medications   Medication Sig Dispense Refill   • Cholecalciferol (VITAMIN D) 2000 units tablet Take 2 tablets by mouth daily     • DULoxetine (CYMBALTA) 60 mg delayed release capsule Take 60 mg by mouth 2 (two) times a day      • eszopiclone (LUNESTA) 3 MG tablet Take 3 mg by mouth daily at bedtime     • folic acid (FOLVITE) 1 mg tablet Take 1 tablet (1 mg total) by mouth daily 30 tablet 5   • Humira Pen 40 MG/0.4ML PNKT INJECT 1 PEN UNDER THE SKIN EVERY 14 DAYS. 2 each 5   • Magnesium 200 MG TABS Take 1 tablet by mouth daily     • methotrexate 2.5 mg tablet Take 8 tablets (20 mg total) by mouth once a week 96 tablet 1   • pregabalin (LYRICA) 100 mg capsule Take 1 capsule by mouth 2 (two) times a day     • Sodium Fluoride (PreviDent 5000 Booster Plus) 1.1 % PSTE Apply on teeth every evening as directed 100 mL 3   • Turmeric 450 MG CAPS Take 1 capsule by mouth 2 (two) times a day       No current facility-administered medications for this visit. Allergies:     No Known Allergies   Physical Exam:     /88 (BP Location: Left arm, Patient Position: Sitting, Cuff Size: Standard)   Pulse 79   Temp 98.6 °F (37 °C) (Tympanic Core)   Resp 16   Ht 5' 4" (1.626 m)   Wt 69.9 kg (154 lb)   LMP  (LMP Unknown)   SpO2 100%   BMI 26.43 kg/m²     Physical Exam  Vitals reviewed. Constitutional:       General: She is not in acute distress. HENT:      Head: Normocephalic. Right Ear: Tympanic membrane and ear canal normal.      Left Ear: Tympanic membrane and ear canal normal.      Nose: Nose normal.      Mouth/Throat:      Mouth: Mucous membranes are moist.   Eyes:      Extraocular Movements: Extraocular movements intact. Conjunctiva/sclera: Conjunctivae normal.      Pupils: Pupils are equal, round, and reactive to light. Comments: Wears glasses   Neck:      Thyroid: No thyromegaly or thyroid tenderness. Vascular: No carotid bruit. Cardiovascular:      Rate and Rhythm: Normal rate and regular rhythm. Pulses: Normal pulses. Heart sounds: Normal heart sounds. Pulmonary:      Effort: Pulmonary effort is normal. No respiratory distress. Breath sounds: Normal breath sounds. No wheezing. Chest:      Comments: Breast exam deferred by patient  Abdominal:      General: Bowel sounds are normal. There is no distension. Palpations: Abdomen is soft. Tenderness: There is no abdominal tenderness. Musculoskeletal:      Cervical back: Neck supple. No tenderness. Right lower leg: No edema. Left lower leg: No edema. Lymphadenopathy:      Cervical: No cervical adenopathy. Skin:     Coloration: Skin is not pale. Neurological:      Mental Status: She is alert and oriented to person, place, and time.    Psychiatric:         Mood and Affect: Mood normal.          [id number] INTERNAL MEDICINE

## 2023-12-01 NOTE — PATIENT INSTRUCTIONS
Wellness Visit for Adults   AMBULATORY CARE:   A wellness visit  is when you see your healthcare provider to get screened for health problems. Your healthcare provider will also give you advice on how to stay healthy. Write down your questions so you remember to ask them. Ask your healthcare provider how often you should have a wellness visit. What happens at a wellness visit:  Your healthcare provider will ask about your health, and your family history of health problems. This includes high blood pressure, heart disease, and cancer. He or she will ask if you have symptoms that concern you, if you smoke, and about your mood. You may also be asked about your intake of medicines, supplements, food, and alcohol. Any of the following may be done: Your weight  will be checked. Your height may also be checked so your body mass index (BMI) can be calculated. Your BMI shows if you are at a healthy weight. Your blood pressure  and heart rate will be checked. Your temperature may also be checked. Blood and urine tests  may be done. Blood tests may be done to check your cholesterol levels. Abnormal cholesterol levels increase your risk for heart disease and stroke. You may also need a blood or urine test to check for diabetes if you are at increased risk. Urine tests may be done to look for signs of an infection or kidney disease. A physical exam  includes checking your heartbeat and lungs with a stethoscope. Your healthcare provider may also check your skin to look for sun damage. Screening tests  may be recommended. A screening test is done to check for diseases that may not cause symptoms. The screening tests you may need depend on your age, gender, family history, and lifestyle habits. For example, colorectal screening may be recommended if you are 48years old or older. Screening tests you need if you are a woman:   A Pap smear  is used to screen for cervical cancer.  Pap smears are usually done every 3 to 5 years depending on your age. You may need them more often if you have had abnormal Pap smear test results in the past. Ask your healthcare provider how often you should have a Pap smear. A mammogram  is an x-ray of your breasts to screen for breast cancer. Experts recommend mammograms every 2 years starting at age 48 years. You may need a mammogram at age 52 years or younger if you have an increased risk for breast cancer. Talk to your healthcare provider about when you should start having mammograms and how often you need them. Vaccines you may need:   Get an influenza vaccine  every year. The influenza vaccine protects you from the flu. Several types of viruses cause the flu. The viruses change over time, so new vaccines are made each year. Get a tetanus-diphtheria (Td) booster vaccine  every 10 years. This vaccine protects you against tetanus and diphtheria. Tetanus is a severe infection that may cause painful muscle spasms and lockjaw. Diphtheria is a severe bacterial infection that causes a thick covering in the back of your mouth and throat. Get a human papillomavirus (HPV) vaccine  if you are female and aged 23 to 32 or male 23 to 24 and never received it. This vaccine protects you from HPV infection. HPV is the most common infection spread by sexual contact. HPV may also cause vaginal, penile, and anal cancers. Get a pneumococcal vaccine  if you are aged 72 years or older. The pneumococcal vaccine is an injection given to protect you from pneumococcal disease. Pneumococcal disease is an infection caused by pneumococcal bacteria. The infection may cause pneumonia, meningitis, or an ear infection. Get a shingles vaccine  if you are 60 or older, even if you have had shingles before. The shingles vaccine is an injection to protect you from the varicella-zoster virus. This is the same virus that causes chickenpox.  Shingles is a painful rash that develops in people who had chickenpox or have been exposed to the virus. How to eat healthy:  My Plate is a model for planning healthy meals. It shows the types and amounts of foods that should go on your plate. Fruits and vegetables make up about half of your plate, and grains and protein make up the other half. A serving of dairy is included on the side of your plate. The amount of calories and serving sizes you need depends on your age, gender, weight, and height. Examples of healthy foods are listed below:  Eat a variety of vegetables  such as dark green, red, and orange vegetables. You can also include canned vegetables low in sodium (salt) and frozen vegetables without added butter or sauces. Eat a variety of fresh fruits , canned fruit in 100% juice, frozen fruit, and dried fruit. Include whole grains. At least half of the grains you eat should be whole grains. Examples include whole-wheat bread, wheat pasta, brown rice, and whole-grain cereals such as oatmeal.    Eat a variety of protein foods such as seafood (fish and shellfish), lean meat, and poultry without skin (turkey and chicken). Examples of lean meats include pork leg, shoulder, or tenderloin, and beef round, sirloin, tenderloin, and extra lean ground beef. Other protein foods include eggs and egg substitutes, beans, peas, soy products, nuts, and seeds. Choose low-fat dairy products such as skim or 1% milk or low-fat yogurt, cheese, and cottage cheese. Limit unhealthy fats  such as butter, hard margarine, and shortening. Exercise:  Exercise at least 30 minutes per day on most days of the week. Some examples of exercise include walking, biking, dancing, and swimming. You can also fit in more physical activity by taking the stairs instead of the elevator or parking farther away from stores. Include muscle strengthening activities 2 days each week. Regular exercise provides many health benefits.  It helps you manage your weight, and decreases your risk for type 2 diabetes, heart disease, stroke, and high blood pressure. Exercise can also help improve your mood. Ask your healthcare provider about the best exercise plan for you. General health and safety guidelines:   Do not smoke. Nicotine and other chemicals in cigarettes and cigars can cause lung damage. Ask your healthcare provider for information if you currently smoke and need help to quit. E-cigarettes or smokeless tobacco still contain nicotine. Talk to your healthcare provider before you use these products. Limit alcohol. A drink of alcohol is 12 ounces of beer, 5 ounces of wine, or 1½ ounces of liquor. Lose weight, if needed. Being overweight increases your risk of certain health conditions. These include heart disease, high blood pressure, type 2 diabetes, and certain types of cancer. Protect your skin. Do not sunbathe or use tanning beds. Use sunscreen with a SPF 15 or higher. Apply sunscreen at least 15 minutes before you go outside. Reapply sunscreen every 2 hours. Wear protective clothing, hats, and sunglasses when you are outside. Drive safely. Always wear your seatbelt. Make sure everyone in your car wears a seatbelt. A seatbelt can save your life if you are in an accident. Do not use your cell phone when you are driving. This could distract you and cause an accident. Pull over if you need to make a call or send a text message. Practice safe sex. Use latex condoms if are sexually active and have more than one partner. Your healthcare provider may recommend screening tests for sexually transmitted infections (STIs). Wear helmets, lifejackets, and protective gear. Always wear a helmet when you ride a bike or motorcycle, go skiing, or play sports that could cause a head injury. Wear protective equipment when you play sports. Wear a lifejacket when you are on a boat or doing water sports.     © Copyright [employer] 2023 Information is for End User's use only and may not be sold, redistributed or otherwise used for commercial purposes. The above information is an  only. It is not intended as medical advice for individual conditions or treatments. Talk to your doctor, nurse or pharmacist before following any medical regimen to see if it is safe and effective for you. Cholesterol and Your Health   AMBULATORY CARE:   Cholesterol  is a waxy, fat-like substance. Your body uses cholesterol to make hormones and new cells, and to protect nerves. Cholesterol is made by your body. It also comes from certain foods you eat, such as meat and dairy products. Your healthcare provider can help you set goals for your cholesterol levels. Your provider can help you create a plan to meet your goals. Cholesterol level goals: Your cholesterol level goals depend on your risk for heart disease, your age, and your other health conditions. The following are general guidelines: Total cholesterol  includes low-density lipoprotein (LDL), high-density lipoprotein (HDL), and triglyceride levels. The total cholesterol level should be lower than 200 mg/dL and is best at about 150 mg/dL. LDL cholesterol  is called bad cholesterol  because it forms plaque in your arteries. As plaque builds up, your arteries become narrow, and less blood flows through. When plaque decreases blood flow to your heart, you may have chest pain. If plaque completely blocks an artery that brings blood to your heart, you may have a heart attack. Plaque can break off and form blood clots. Blood clots may block arteries in your brain and cause a stroke. The level should be less than 130 mg/dL and is best at about 100 mg/dL. HDL cholesterol  is called good cholesterol  because it helps remove LDL cholesterol from your arteries. It does this by attaching to LDL cholesterol and carrying it to your liver. Your liver breaks down LDL cholesterol so your body can get rid of it.  High levels of HDL cholesterol can help prevent a heart attack and stroke. Low levels of HDL cholesterol can increase your risk for heart disease, heart attack, and stroke. The level should be at least 40 mg/dL in males or at least 50 mg/dL in females. Triglycerides  are a type of fat that store energy from foods you eat. High levels of triglycerides also cause plaque buildup. This can increase your risk for a heart attack or stroke. If your triglyceride level is high, your LDL cholesterol level may also be high. The level should be less than 150 mg/dL. Any of the following can increase your risk for high cholesterol:   Smoking or drinking large amounts of alcohol    Having overweight or obesity, or not getting enough exercise    A medical condition such as hypertension (high blood pressure) or diabetes    A family history of high cholesterol    Age older than 72    What you need to know about having your cholesterol levels checked: Adults 21to 39years of age should have their cholesterol levels checked every 4 to 6 years. Adults 45 years or older should have their cholesterol checked every 1 to 2 years. You may need your cholesterol checked more often, or at a younger age, if you have risk factors for heart disease. You may also need to have your cholesterol checked more often if you have other health conditions, such as diabetes. Blood tests are used to check cholesterol levels. Blood tests measure your levels of triglycerides, LDL cholesterol, and HDL cholesterol. How healthy fats affect your cholesterol levels:  Healthy fats, also called unsaturated fats, help lower LDL cholesterol and triglyceride levels. Healthy fats include the following:  Monounsaturated fats  are found in foods such as olive oil, canola oil, avocado, nuts, and olives. Polyunsaturated fats,  such as omega 3 fats, are found in fish, such as salmon, trout, and tuna. They can also be found in plant foods such as flaxseed, walnuts, and soybeans.     How unhealthy fats affect your cholesterol levels: Unhealthy fats increase LDL cholesterol and triglyceride levels. They are found in foods high in cholesterol, saturated fat, and trans fat:  Cholesterol  is found in eggs, dairy, and meat. Saturated fat  is found in butter, cheese, ice cream, whole milk, and coconut oil. Saturated fat is also found in meat, such as sausage, hot dogs, and bologna. Trans fat  is found in liquid oils and is used in fried and baked foods. Foods that contain trans fats include chips, crackers, muffins, sweet rolls, microwave popcorn, and cookies. Treatment  for high cholesterol will also decrease your risk of heart disease, heart attack, and stroke. Treatment may include any of the following:  Lifestyle changes  may include food, exercise, weight loss, and quitting smoking. You may also need to decrease the amount of alcohol you drink. Your healthcare provider will want you to start with lifestyle changes. Other treatment may be added if lifestyle changes are not enough. Your healthcare provider may recommend you work with a team to manage hyperlipidemia. The team may include medical experts such as a dietitian, an exercise or physical therapist, and a behavior therapist. Your family members may be included in helping you create lifestyle changes. Medicines  may be given to lower your LDL cholesterol, triglyceride levels, or total cholesterol level. You may need medicines to lower your cholesterol if any of the following is true:    You have a history of stroke, TIA, unstable angina, or a heart attack. Your LDL cholesterol level is 190 mg/dL or higher. You are age 36 to 76 years, have diabetes or heart disease risk factors, and your LDL cholesterol is 70 mg/dL or higher. Supplements  include fish oil, red yeast rice, and garlic. Fish oil may help lower your triglyceride and LDL cholesterol levels. It may also increase your HDL cholesterol level.  Red yeast rice may help decrease your total cholesterol level and LDL cholesterol level. Garlic may help lower your total cholesterol level. Do not take any supplements without talking to your healthcare provider. Food changes you can make to lower your cholesterol levels:  A dietitian can help you create a healthy eating plan. Your dietitian can show you how to read food labels and choose foods low in saturated fat, trans fats, and cholesterol. Decrease the total amount of fat you eat. Choose lean meats, fat-free or 1% fat milk, and low-fat dairy products, such as yogurt and cheese. Try to limit or avoid red meats. Limit or do not eat fried foods or baked goods, such as cookies. Replace unhealthy fats with healthy fats. Cook foods in olive oil or canola oil. Choose soft margarines that are low in saturated fat and trans fat. Seeds, nuts, and avocados are other examples of healthy fats. Eat foods with omega-3 fats. Examples include salmon, tuna, mackerel, walnuts, and flaxseed. Eat fish 2 times per week. Pregnant women should not eat fish that have high levels of mercury, such as shark, swordfish, and manan mackerel. Increase the amount of high-fiber foods you eat. High-fiber foods can help lower your LDL cholesterol. Aim to get between 20 and 30 grams of fiber each day. Fruits and vegetables are high in fiber. Eat at least 5 servings each day. Other high-fiber foods are whole-grain or whole-wheat breads, pastas, or cereals, and brown rice. Eat 3 ounces of whole-grain foods each day. Increase fiber slowly. You may have abdominal discomfort, bloating, and gas if you add fiber to your diet too quickly. Eat healthy protein foods. Examples include low-fat dairy products, skinless chicken and turkey, fish, and nuts. Limit foods and drinks that are high in sugar. Your dietitian or healthcare provider can help you create daily limits for high-sugar foods and drinks. The limit may be lower if you have diabetes or another health condition.  Limits can also help you lose weight if needed. Lifestyle changes you can make to lower your cholesterol levels:   Maintain a healthy weight. Ask your healthcare provider what a healthy weight is for you. Ask your provider to help you create a weight loss plan if needed. Weight loss can decrease your total cholesterol and triglyceride levels. Weight loss may also help keep your blood pressure at a healthy level. Be physically active throughout the day. Physical activity, such as exercise, can help lower your total cholesterol level and maintain a healthy weight. Physical activity can also help increase your HDL cholesterol level. Work with your healthcare provider to create an program that is right for you. Get at least 30 to 40 minutes of moderate physical activity most days of the week. Examples of exercise include brisk walking, swimming, or biking. Also include strength training at least 2 times each week. Your healthcare providers can help you create a physical activity plan. Do not smoke. Nicotine and other chemicals in cigarettes and cigars can raise your cholesterol levels. Ask your healthcare provider for information if you currently smoke and need help to quit. E-cigarettes or smokeless tobacco still contain nicotine. Talk to your healthcare provider before you use these products. Limit or do not drink alcohol. Alcohol can increase your triglyceride levels. Ask your healthcare provider before you drink alcohol. Ask how much is okay for you to drink in 24 hours or 1 week. Follow up with your doctor as directed:  Write down your questions so you remember to ask them during your visits. © Copyright [employer] 2023 Information is for End User's use only and may not be sold, redistributed or otherwise used for commercial purposes. The above information is an  only. It is not intended as medical advice for individual conditions or treatments.  Talk to your doctor, nurse or pharmacist before following any medical regimen to see if it is safe and effective for you.

## 2023-12-04 LAB
LAB AP GYN PRIMARY INTERPRETATION: NORMAL
Lab: NORMAL

## 2024-01-22 ENCOUNTER — OFFICE VISIT (OUTPATIENT)
Dept: INTERNAL MEDICINE CLINIC | Facility: CLINIC | Age: 64
End: 2024-01-22
Payer: COMMERCIAL

## 2024-01-22 VITALS
RESPIRATION RATE: 16 BRPM | OXYGEN SATURATION: 100 % | SYSTOLIC BLOOD PRESSURE: 124 MMHG | WEIGHT: 154 LBS | HEART RATE: 70 BPM | DIASTOLIC BLOOD PRESSURE: 70 MMHG | TEMPERATURE: 97.9 F | HEIGHT: 64 IN | BODY MASS INDEX: 26.29 KG/M2

## 2024-01-22 DIAGNOSIS — U09.9 POST-COVID-19 CONDITION: ICD-10-CM

## 2024-01-22 DIAGNOSIS — M06.09 RHEUMATOID ARTHRITIS OF MULTIPLE SITES WITH NEGATIVE RHEUMATOID FACTOR (HCC): ICD-10-CM

## 2024-01-22 DIAGNOSIS — J01.00 ACUTE NON-RECURRENT MAXILLARY SINUSITIS: Primary | ICD-10-CM

## 2024-01-22 PROCEDURE — 99214 OFFICE O/P EST MOD 30 MIN: CPT | Performed by: INTERNAL MEDICINE

## 2024-01-22 RX ORDER — AMOXICILLIN AND CLAVULANATE POTASSIUM 875; 125 MG/1; MG/1
1 TABLET, FILM COATED ORAL EVERY 12 HOURS SCHEDULED
Qty: 20 TABLET | Refills: 0 | Status: SHIPPED | OUTPATIENT
Start: 2024-01-22 | End: 2024-02-01

## 2024-01-22 NOTE — PROGRESS NOTES
Assessment/Plan:    Problem List Items Addressed This Visit     Rheumatoid arthritis (HCC)     -recommend holding MTX x 1 week  -also on Humira q14d        Other Visit Diagnoses     Acute non-recurrent maxillary sinusitis    -  Primary    -post COVID infxn  -start augmentin x 10d, side effects discussed.  Continue nasal spray  -return if sx do not improve  -hold MTX x 1 week    Relevant Medications    amoxicillin-clavulanate (AUGMENTIN) 875-125 mg per tablet    Post-COVID-19 condition              Subjective:      Patient ID: Emy Sher is a 63 y.o. female.    HPI      Woke up with COVID 1/1, lasted several days.  Now has sinus pressure, HA, productive cough.  She has been taking sinus HA otc medication, saline spray.  Denies fever    The following portions of the patient's history were reviewed and updated as appropriate: allergies, current medications, past family history, past medical history, past social history, past surgical history and problem list.      Current Outpatient Medications:   •  amoxicillin-clavulanate (AUGMENTIN) 875-125 mg per tablet, Take 1 tablet by mouth every 12 (twelve) hours for 10 days, Disp: 20 tablet, Rfl: 0  •  Cholecalciferol (VITAMIN D) 2000 units tablet, Take 2 tablets by mouth daily, Disp: , Rfl:   •  DULoxetine (CYMBALTA) 60 mg delayed release capsule, Take 60 mg by mouth 2 (two) times a day , Disp: , Rfl:   •  eszopiclone (LUNESTA) 3 MG tablet, Take 3 mg by mouth daily at bedtime, Disp: , Rfl:   •  folic acid (FOLVITE) 1 mg tablet, TAKE ONE TABLET BY MOUTH EVERY DAY, Disp: 30 tablet, Rfl: 5  •  Humira Pen 40 MG/0.4ML PNKT, INJECT 1 PEN UNDER THE SKIN EVERY 14 DAYS., Disp: 2 each, Rfl: 5  •  Magnesium 200 MG TABS, Take 1 tablet by mouth daily, Disp: , Rfl:   •  methotrexate 2.5 MG tablet, TAKE 8 TABLETS BY MOUTH ONCE A WEEK, Disp: 96 tablet, Rfl: 1  •  pregabalin (LYRICA) 100 mg capsule, Take 1 capsule by mouth 2 (two) times a day, Disp: , Rfl:   •  Sodium Fluoride  "(PreviDent 5000 Booster Plus) 1.1 % PSTE, Apply on teeth every evening as directed, Disp: 100 mL, Rfl: 3  •  Turmeric 450 MG CAPS, Take 1 capsule by mouth 2 (two) times a day, Disp: , Rfl:     Review of Systems   Constitutional:  Negative for fever.   HENT:  Positive for congestion, sinus pressure and sinus pain. Negative for sore throat.    Respiratory:  Positive for cough. Negative for shortness of breath and wheezing.    Neurological:  Positive for headaches.         Objective:    /70   Pulse 70   Temp 97.9 °F (36.6 °C)   Resp 16   Ht 5' 4\" (1.626 m)   Wt 69.9 kg (154 lb)   LMP  (LMP Unknown)   SpO2 100%   BMI 26.43 kg/m²      Physical Exam  Vitals reviewed.   Constitutional:       General: She is not in acute distress.  HENT:      Head: Normocephalic.      Right Ear: Tympanic membrane and ear canal normal.      Left Ear: Tympanic membrane and ear canal normal.      Nose: Nose normal.      Mouth/Throat:      Mouth: Mucous membranes are moist.   Cardiovascular:      Rate and Rhythm: Normal rate and regular rhythm.      Pulses: Normal pulses.      Heart sounds: Normal heart sounds.   Pulmonary:      Effort: Pulmonary effort is normal. No respiratory distress.      Breath sounds: Normal breath sounds. No wheezing.   Musculoskeletal:      Cervical back: No tenderness.   Lymphadenopathy:      Cervical: No cervical adenopathy.   Neurological:      Mental Status: She is alert and oriented to person, place, and time.         "

## 2024-02-09 ENCOUNTER — HOSPITAL ENCOUNTER (OUTPATIENT)
Dept: RADIOLOGY | Age: 64
Discharge: HOME | End: 2024-02-09
Payer: COMMERCIAL

## 2024-02-09 VITALS — WEIGHT: 155 LBS | HEIGHT: 64 IN | BODY MASS INDEX: 26.46 KG/M2

## 2024-02-09 DIAGNOSIS — Z12.31 VISIT FOR SCREENING MAMMOGRAM: ICD-10-CM

## 2024-02-09 PROCEDURE — 77067 SCR MAMMO BI INCL CAD: CPT

## 2024-02-09 PROCEDURE — 77063 BREAST TOMOSYNTHESIS BI: CPT

## 2024-02-22 ENCOUNTER — TELEPHONE (OUTPATIENT)
Age: 64
End: 2024-02-22

## 2024-02-22 NOTE — TELEPHONE ENCOUNTER
PA for Hyrimoz    Submitted via    [x]CMM-KEY [id number]  [][facility]-Case ID #    []Faxed to plan   []Other website    []Phone call Case ID #      Office notes sent, clinical questions answered. Awaiting determination    Turnaround time for your insurance to make a decision on your Prior Authorization can take 7-21 business days.

## 2024-02-22 NOTE — TELEPHONE ENCOUNTER
PA for Hyrimoz    Submitted via    []CMM-KEY    [x][place]-Case ID #     [id number]     []Faxed to plan   []Other website    []Phone call Case ID #      Office notes sent, clinical questions answered. Awaiting determination    Turnaround time for your insurance to make a decision on your Prior Authorization can take 7-21 business days.

## 2024-02-26 NOTE — TELEPHONE ENCOUNTER
PA for Hyrimoz Denied    Reason:(Screenshot if applicable)          Message sent to office clinical pool Yes    Denial letter scanned into Media Yes    Appeal started Yes

## 2024-02-26 NOTE — TELEPHONE ENCOUNTER
PA for Hyrimoz appealed via     []CMM  []SS  [x]Letter sent to insurance via fax [facility] [phone]  []Other site or means      All necessary records re faxed sent. Will await response from insurance company    Turnaround time for a decision to be made on an appeal could take up to 30 business days

## 2024-03-01 NOTE — TELEPHONE ENCOUNTER
Pt calling for an update. I advised her that we did start the appeal process but it could take up to 30 days for a decision. She asked what she should do in the mean time because she is due for her next dose tomorrow. I called over and spoke to Jess and she advised they did not have anymore samples and she recommenced the pt call the pharmacy and see if they could provide her with an emergency dose.     Pt state she would do that and see what they say.

## 2024-03-11 ENCOUNTER — TELEPHONE (OUTPATIENT)
Age: 64
End: 2024-03-11

## 2024-03-11 NOTE — TELEPHONE ENCOUNTER
Incoming call: Gia ([facility])    Re: Pt of Dr. Zeng     Re: prior auth for Humira  Already approval for Hyrimoz. If pt needs Humira, submit new prior auth starting 4/1/24.     Phone# for questions    [phone] ([facility])    [phone] (prior auth department)

## 2024-03-12 ENCOUNTER — TELEPHONE (OUTPATIENT)
Age: 64
End: 2024-03-12

## 2024-03-12 NOTE — TELEPHONE ENCOUNTER
PA for Humira    Submitted via    [x]CMM-KEY [id number]  [][facility]-Case ID #    []Faxed to plan   []Other website    []Phone call Case ID #      Office notes sent, clinical questions answered. Awaiting determination    Turnaround time for your insurance to make a decision on your Prior Authorization can take 7-21 business days.

## 2024-03-12 NOTE — TELEPHONE ENCOUNTER
MD Pily Bobby  If the patient is ok with the biosimilar, she can get the script that's approved. Not sure we can appeal it if her insurance substituted the biosimilar.

## 2024-03-12 NOTE — TELEPHONE ENCOUNTER
Spoke with patient - she was informed that Hyrimoz was approved but the pharmacy is not able to fill the prescription until April. She would like us to move forward with the Humira PA so she is not without the medication.

## 2024-03-13 ENCOUNTER — TELEPHONE (OUTPATIENT)
Age: 64
End: 2024-03-13

## 2024-03-13 NOTE — TELEPHONE ENCOUNTER
Emy called to schedule an appt with [facility] due to her insurance stating  is not in network . I advise the patient to call her insurance and provider her with  NPI and [facility]PI . Providers are in the same network. And the patient is already established with  didn't want to make the change unless its neccessary . Patient will call back once she speak to her insurance

## 2024-03-18 NOTE — TELEPHONE ENCOUNTER
PA for Humira Approved   Date(s) approved 3/18/24-3/18/25  Case #     Patient advised by [x] MyChart Message                      [] Phone call       Pharmacy advised by [x]Fax                                     []Phone call    Approval letter scanned/ into Media Yes    [facility]

## 2024-03-18 NOTE — TELEPHONE ENCOUNTER
Virgie from [facility] called, she would like to know If the patient should stay on the Humira, or if can switch to Hyrim oz. The Hyromoz is a preferred drug and is approved for one year. They have the med as a paid claim for a 3 month supply as of 3/12. If you would like the Humira the Prior auth would have to be resubmitted after 4/1/24, and it may need a peer to peer. Please call back and advise. [phone].

## 2024-03-23 ENCOUNTER — APPOINTMENT (OUTPATIENT)
Dept: LAB | Age: 64
End: 2024-03-23
Payer: COMMERCIAL

## 2024-03-23 DIAGNOSIS — Z13.6 SCREENING FOR CARDIOVASCULAR CONDITION: ICD-10-CM

## 2024-03-23 LAB
CHOLEST SERPL-MCNC: 176 MG/DL
HDLC SERPL-MCNC: 47 MG/DL
LDLC SERPL CALC-MCNC: 106 MG/DL (ref 0–100)
NONHDLC SERPL-MCNC: 129 MG/DL
TRIGL SERPL-MCNC: 117 MG/DL

## 2024-03-23 PROCEDURE — 80061 LIPID PANEL: CPT

## 2024-03-28 ENCOUNTER — OFFICE VISIT (OUTPATIENT)
Age: 64
End: 2024-03-28
Payer: COMMERCIAL

## 2024-03-28 VITALS
BODY MASS INDEX: 26.77 KG/M2 | TEMPERATURE: 97.2 F | DIASTOLIC BLOOD PRESSURE: 68 MMHG | WEIGHT: 156.8 LBS | HEIGHT: 64 IN | OXYGEN SATURATION: 99 % | SYSTOLIC BLOOD PRESSURE: 118 MMHG | HEART RATE: 69 BPM

## 2024-03-28 DIAGNOSIS — M25.551 PAIN OF RIGHT HIP: ICD-10-CM

## 2024-03-28 DIAGNOSIS — M35.00 SJOGREN'S SYNDROME WITHOUT EXTRAGLANDULAR INVOLVEMENT (HCC): ICD-10-CM

## 2024-03-28 DIAGNOSIS — M81.0 SENILE OSTEOPOROSIS: ICD-10-CM

## 2024-03-28 DIAGNOSIS — M54.6 CHRONIC LEFT-SIDED THORACIC BACK PAIN: ICD-10-CM

## 2024-03-28 DIAGNOSIS — M15.0 PRIMARY GENERALIZED (OSTEO)ARTHRITIS: ICD-10-CM

## 2024-03-28 DIAGNOSIS — M06.09 RHEUMATOID ARTHRITIS OF MULTIPLE SITES WITH NEGATIVE RHEUMATOID FACTOR (HCC): Primary | ICD-10-CM

## 2024-03-28 DIAGNOSIS — G89.29 CHRONIC LEFT-SIDED THORACIC BACK PAIN: ICD-10-CM

## 2024-03-28 DIAGNOSIS — M79.7 FIBROMYALGIA: ICD-10-CM

## 2024-03-28 PROCEDURE — 99214 OFFICE O/P EST MOD 30 MIN: CPT | Performed by: INTERNAL MEDICINE

## 2024-03-28 NOTE — PATIENT INSTRUCTIONS
Get the x-rays of your back and right hip.  I suspect her hip pain may be referred from your back.  With regard to your upper back, I think increasing the massage therapy to weekly for several weeks may help significantly.  If your hip pain persists, I would suggest formal physical therapy.  Call the office if you want a prescription for that.  Continue lab work every 3 months.  I gave you a new prescription for that.

## 2024-03-28 NOTE — PROGRESS NOTES
Assessment/Plan:    Rheumatoid arthritis (HCC)  Rheumatoid arthritis well-controlled with Humira and weekly methotrexate.  No sign of active synovitis on exam at this visit.  Monitor disease activity and medication toxicity with lab work as ordered.  Monitor QuantiFERON gold yearly.  She will be due for updated QuantiFERON gold in November 2024.  Follow-up 3 to 4 months or sooner if needed.    Primary generalized (osteo)arthritis  Primary generalized osteoarthritis with intermittent right first carpometacarpal joint arthralgias.  Current complaint of twinges in her back with right hip and buttock pain questionably referred from lumbar spondylosis.  Refer for x-rays thoracic and lumbar spine and right hip.  I have suggested that she increase her muscle massage therapy, and if that does not help, would refer for formal physical therapy.  Continue to monitor.    Sjogren's syndrome without extraglandular involvement (formerly [facility])  Sicca symptoms stable with eyedrops and XyliMelts. Dental visits have been stable.  Encourage continued vigilant dental hygiene.  Continue to monitor.    Fibromyalgia  Myofascial symptoms stable on Lyrica and Cymbalta.  Encourage home exercise program as tolerated.  Monitor labs for medication toxicity.  Continue to monitor.    Senile osteoporosis  Osteoporosis treated with 3 yearly doses of IV Reclast completed in June 2022.  She had been on Fosamax in the past.  Encouraged her to go for updated DEXA for further evaluation of need for additional treatment.  Continue calcium and vitamin D supplement and home exercise program.    Pain of right hip  Right hip and buttock pain questionably referred from back.  Will refer for x-rays lumbar spine and right hip.  Will also get x-rays thoracic spine if she continues to have thoracic paraspinal complaints, generally left-sided.  Encourage increased frequency of muscle massage therapy.  If symptoms persist will refer for formal physical therapy.    Chronic  left-sided thoracic back pain  Chronic thoracic paraspinal spasm.  She does treat with muscle massage therapist.  I suggested increasing the frequency of her muscle use/therapy.  Refer for plain films thoracic spine for further evaluation.         Problem List Items Addressed This Visit     Fibromyalgia     Myofascial symptoms stable on Lyrica and Cymbalta.  Encourage home exercise program as tolerated.  Monitor labs for medication toxicity.  Continue to monitor.         Senile osteoporosis     Osteoporosis treated with 3 yearly doses of IV Reclast completed in June 2022.  She had been on Fosamax in the past.  Encouraged her to go for updated DEXA for further evaluation of need for additional treatment.  Continue calcium and vitamin D supplement and home exercise program.         Rheumatoid arthritis (HCC) - Primary     Rheumatoid arthritis well-controlled with Humira and weekly methotrexate.  No sign of active synovitis on exam at this visit.  Monitor disease activity and medication toxicity with lab work as ordered.  Monitor QuantiFERON gold yearly.  She will be due for updated QuantiFERON gold in November 2024.  Follow-up 3 to 4 months or sooner if needed.         Relevant Orders    CBC and differential    Comprehensive metabolic panel    Sedimentation rate, automated    C-reactive protein    Urinalysis with microscopic    Primary generalized (osteo)arthritis     Primary generalized osteoarthritis with intermittent right first carpometacarpal joint arthralgias.  Current complaint of twinges in her back with right hip and buttock pain questionably referred from lumbar spondylosis.  Refer for x-rays thoracic and lumbar spine and right hip.  I have suggested that she increase her muscle massage therapy, and if that does not help, would refer for formal physical therapy.  Continue to monitor.         Sjogren's syndrome without extraglandular involvement (HCC)     Sicca symptoms stable with eyedrops and XyliMelts.  Dental visits have been stable.  Encourage continued vigilant dental hygiene.  Continue to monitor.         Chronic left-sided thoracic back pain     Chronic thoracic paraspinal spasm.  She does treat with muscle massage therapist.  I suggested increasing the frequency of her muscle use/therapy.  Refer for plain films thoracic spine for further evaluation.         Relevant Orders    XR spine lumbar minimum 4 views non injury    XR spine thoracic 3 vw    Pain of right hip     Right hip and buttock pain questionably referred from back.  Will refer for x-rays lumbar spine and right hip.  Will also get x-rays thoracic spine if she continues to have thoracic paraspinal complaints, generally left-sided.  Encourage increased frequency of muscle massage therapy.  If symptoms persist will refer for formal physical therapy.         Relevant Orders    XR hip/pelv 2-3 vws right if performed           Reviewed records, labs, and imaging with the patient in detail.  Counseled patient.  Discussion regarding my findings and recommendations.  Office visit with documentation 35 min.    Subjective:      Patient ID: Emy Sher is a 63 y.o. female.    Patient with seronegative rheumatoid arthritis.  She is currently being treated with oral methotrexate and Humira injections.  Overall her rheumatoid arthritis symptoms are stable.  She has minimal stiffness in the morning.  She has no swelling in her joints.  She notes some numbness in her hands right greater than left on occasion.  She did have EMG positive for mild right carpal tunnel syndrome.  She has had pain in the base of her right thumb.  She has also had some twinges of back pain after laying on a mat in [employer] class. she has a history of fibromyalgia with chronic myofascial symptoms.  She does note some intermittent right hip and buttock pain, questionably referred from her back.  She is currently taking Lyrica and Cymbalta.  Her myofascial symptoms are generally stable  however she does notice more aches and pains with weather and seasonal changes. She has a history of secondary Sjogren's with chronic dry eye symptoms.  She is using drops as needed.    She has a history of osteoporosis.  She is currently receiving IV Reclast.  She received her 3rd dose on 06/06/2022.  She has previously taken Fosamax in the past.  Her most recent DEXA scan was done on 11/12/2021.  Lumbar spine T-score-2.5 which has increased 4.2% as compared to her previous scan.  Left total hip T-score-1.7 which has increased 3.7% as compared to her previous scan.  Left femoral neck T-score -2.2.  Right total hip T-score-1.5 which has increased 5.8% as compared to her previous scan.  Right femoral neck T-score-1.6.  Forearm T-score-1.4.  She is due for updated DEXA, however, has not yet gone.    Lab work dated 3/23/2024 significant for CBC with white blood cell count 5.09.  Absolute neutrophil count 1.74.  Eosinophils slightly high at 8%.  Sed rate 14.  CRP 1.3.  Estimated GFR 78.  Urinalysis trace protein.  Total cholesterol 176.  Triglyceride 117.  HDL 47.  .  QuantiFERON gold negative 11/7/2023.      Emy Sher is a 63-year-old female who comes in today for follow-up. ARINA consent obtained.    Laboratory Data  Lab work dated 03/23/2024 significant for cholesterol 176, triglyceride 117, HDL 47, LDL calculated 106. Calcium 8.9. Creatinine 0.81 with estimated GFR 78. C-reactive protein 1.3. Sed rate 14. CBC essentially unremarkable with slightly decreased relative neutrophils with increased monocytes and increased eosinophils. Absolute neutrophil count 1.74. She frequently has mild leukopenia. Urinalysis essentially unremarkable. Note, QuantiFERON Gold negative dated 11/07/2023.    Allergies  No Known Allergies    Home Medications    Current Outpatient Medications:   •  Cholecalciferol (VITAMIN D) 2000 units tablet, Take 2 tablets by mouth daily, Disp: , Rfl:   •  DULoxetine (CYMBALTA) 60 mg delayed  release capsule, Take 60 mg by mouth 2 (two) times a day , Disp: , Rfl:   •  eszopiclone (LUNESTA) 3 MG tablet, Take 3 mg by mouth daily at bedtime, Disp: , Rfl:   •  folic acid (FOLVITE) 1 mg tablet, TAKE ONE TABLET BY MOUTH EVERY DAY, Disp: 30 tablet, Rfl: 5  •  Hyrimoz 40 MG/0.4ML SOAJ, INJECT 1 PEN UNDER THE SKIN EVERY 14 DAYS., Disp: 6 mL, Rfl: 1  •  Magnesium 200 MG TABS, Take 1 tablet by mouth daily, Disp: , Rfl:   •  methotrexate 2.5 MG tablet, TAKE 8 TABLETS BY MOUTH ONCE A WEEK, Disp: 96 tablet, Rfl: 1  •  pregabalin (LYRICA) 100 mg capsule, Take 1 capsule by mouth 2 (two) times a day, Disp: , Rfl:   •  Sodium Fluoride (PreviDent 5000 Booster Plus) 1.1 % PSTE, Apply on teeth every evening as directed, Disp: 100 mL, Rfl: 3  •  Turmeric 450 MG CAPS, Take 1 capsule by mouth 2 (two) times a day, Disp: , Rfl:     Past Medical History  Past Medical History:   Diagnosis Date   • COVID 2021   • Depression    • Encounter for screening mammogram for breast cancer 10/22/2018   • Endometriosis    • Fibromyalgia, primary    • Ganglion cyst of finger of right hand 02/16/2023   • Hair loss 02/17/2022   • Osteoarthritis    • Osteoporosis    • Rheumatoid arthritis (HCC)    • Scoliosis    • Sjogren's syndrome (HCC)        Past Surgical History   Past Surgical History:   Procedure Laterality Date   • COLONOSCOPY  2016, 2018   • OOPHORECTOMY     • TONSILLECTOMY     • TOOTH EXTRACTION     • TOTAL ABDOMINAL HYSTERECTOMY  2005    at age 44       Family History   Family History   Problem Relation Age of Onset   • Hypertension Mother    • Hypothyroidism Mother    • Osteoporosis Mother    • Pulmonary embolism Mother    • Other Father         cardiac disorder   • Lymphoma Father         chronic lymphoid leukemia   • No Known Problems Sister    • Emphysema Brother         lung   • No Known Problems Son    • No Known Problems Son    • Allergies Son    • Osteoporosis Maternal Grandmother    • Thyroid disease Maternal Grandmother    •  "Transient ischemic attack Maternal Grandmother    • No Known Problems Maternal Grandfather    • No Known Problems Paternal Grandmother    • No Known Problems Paternal Grandfather    • Thyroid cancer Maternal Aunt        The following portions of the patient's history were reviewed and updated as appropriate: allergies, current medications, past family history, past medical history, past social history, past surgical history, and problem list.    Review of Systems   Constitutional:  Negative for chills, fatigue and fever.   HENT:  Negative for hearing loss, sore throat and tinnitus.    Eyes:  Negative for pain and visual disturbance.        Dry eyes   Respiratory:  Negative for cough and shortness of breath.    Cardiovascular:  Negative for chest pain and palpitations.   Gastrointestinal:  Negative for abdominal pain, nausea and vomiting.   Genitourinary:  Negative for difficulty urinating.   Musculoskeletal:  Positive for arthralgias (Right hip pain when she gets up from sitting, not worse with walking.  Some right buttock pain as well as hip pain questionably referred from her back.), back pain (upper left back pain, sees muscle massage therapist monthly) and myalgias. Negative for gait problem, joint swelling, neck pain and neck stiffness.   Skin:  Negative for rash.   Neurological:  Positive for numbness (Hands) and headaches. Negative for dizziness, seizures and weakness.   Psychiatric/Behavioral:  Positive for confusion. Negative for decreased concentration and sleep disturbance.          Objective:      /68 (BP Location: Right arm, Patient Position: Sitting, Cuff Size: Adult)   Pulse 69   Temp (!) 97.2 °F (36.2 °C) (Temporal)   Ht 5' 4.25\" (1.632 m)   Wt 71.1 kg (156 lb 12.8 oz)   LMP  (LMP Unknown)   SpO2 99%   BMI 26.71 kg/m²          Physical Exam  Constitutional:       Appearance: Normal appearance.   Cardiovascular:      Rate and Rhythm: Normal rate and regular rhythm.   Pulmonary:      " Breath sounds: Normal breath sounds.   Musculoskeletal:      Comments: Neck slightly decreased lateral flexion.  Spasm noted posterior cervical and shoulder girdle muscles.  Shoulders full range of motion.  Elbows full range of motion.  Wrists full range of motion with no synovitis.  Tinel's positive bilateral wrists.  Hands squaring 1st carpometacarpal joints bilaterally with early Heberden's nodes.  No synovitis appreciated.  Ganglion cyst right index finger DIP joint.  Straight leg raising negative bilaterally.  Some spasm noted in the dorsal paraspinals and lumbosacral paraspinals.  Hips full range of motion.  Triggers bilateral trochanteric bursa.  Knees full range of motion with patellofemoral crepitus.  Ankles full range of motion.  Feet hallux valgus deformities with overlapping.  No synovitis appreciated.    Skin:     General: Skin is warm and dry.   Neurological:      General: No focal deficit present.      Mental Status: She is alert.               Transcribed for Keshia Zeng MD, by Martha Dos Santos/Nani Moore on 04/07/24 at 2:21 PM. Powered by Dragon [employer].

## 2024-04-07 PROBLEM — M54.6 CHRONIC LEFT-SIDED THORACIC BACK PAIN: Status: ACTIVE | Noted: 2024-04-07

## 2024-04-07 PROBLEM — M25.551 PAIN OF RIGHT HIP: Status: ACTIVE | Noted: 2024-04-07

## 2024-04-07 PROBLEM — G89.29 CHRONIC LEFT-SIDED THORACIC BACK PAIN: Status: ACTIVE | Noted: 2024-04-07

## 2024-04-07 NOTE — ASSESSMENT & PLAN NOTE
Sicca symptoms stable with eyedrops and XyliMelts. Dental visits have been stable.  Encourage continued vigilant dental hygiene.  Continue to monitor.

## 2024-04-07 NOTE — ASSESSMENT & PLAN NOTE
Osteoporosis treated with 3 yearly doses of IV Reclast completed in June 2022.  She had been on Fosamax in the past.  Encouraged her to go for updated DEXA for further evaluation of need for additional treatment.  Continue calcium and vitamin D supplement and home exercise program.

## 2024-04-07 NOTE — ASSESSMENT & PLAN NOTE
Right hip and buttock pain questionably referred from back.  Will refer for x-rays lumbar spine and right hip.  Will also get x-rays thoracic spine if she continues to have thoracic paraspinal complaints, generally left-sided.  Encourage increased frequency of muscle massage therapy.  If symptoms persist will refer for formal physical therapy.

## 2024-04-07 NOTE — ASSESSMENT & PLAN NOTE
Primary generalized osteoarthritis with intermittent right first carpometacarpal joint arthralgias.  Current complaint of twinges in her back with right hip and buttock pain questionably referred from lumbar spondylosis.  Refer for x-rays thoracic and lumbar spine and right hip.  I have suggested that she increase her muscle massage therapy, and if that does not help, would refer for formal physical therapy.  Continue to monitor.

## 2024-04-07 NOTE — ASSESSMENT & PLAN NOTE
Myofascial symptoms stable on Lyrica and Cymbalta.  Encourage home exercise program as tolerated.  Monitor labs for medication toxicity.  Continue to monitor.

## 2024-04-07 NOTE — ASSESSMENT & PLAN NOTE
Chronic thoracic paraspinal spasm.  She does treat with muscle massage therapist.  I suggested increasing the frequency of her muscle use/therapy.  Refer for plain films thoracic spine for further evaluation.

## 2024-04-07 NOTE — ASSESSMENT & PLAN NOTE
Rheumatoid arthritis well-controlled with Humira and weekly methotrexate.  No sign of active synovitis on exam at this visit.  Monitor disease activity and medication toxicity with lab work as ordered.  Monitor QuantiFERON gold yearly.  She will be due for updated QuantiFERON gold in November 2024.  Follow-up 3 to 4 months or sooner if needed.

## 2024-04-09 ENCOUNTER — APPOINTMENT (OUTPATIENT)
Dept: RADIOLOGY | Age: 64
End: 2024-04-09
Payer: COMMERCIAL

## 2024-04-09 DIAGNOSIS — M54.6 CHRONIC LEFT-SIDED THORACIC BACK PAIN: ICD-10-CM

## 2024-04-09 DIAGNOSIS — M25.551 PAIN OF RIGHT HIP: ICD-10-CM

## 2024-04-09 DIAGNOSIS — G89.29 CHRONIC LEFT-SIDED THORACIC BACK PAIN: ICD-10-CM

## 2024-04-09 PROCEDURE — 72072 X-RAY EXAM THORAC SPINE 3VWS: CPT

## 2024-04-09 PROCEDURE — 72110 X-RAY EXAM L-2 SPINE 4/>VWS: CPT

## 2024-04-09 PROCEDURE — 73502 X-RAY EXAM HIP UNI 2-3 VIEWS: CPT

## 2024-08-02 ENCOUNTER — APPOINTMENT (OUTPATIENT)
Dept: LAB | Facility: CLINIC | Age: 64
End: 2024-08-02
Payer: COMMERCIAL

## 2024-08-02 DIAGNOSIS — M06.09 RHEUMATOID ARTHRITIS OF MULTIPLE SITES WITH NEGATIVE RHEUMATOID FACTOR (HCC): ICD-10-CM

## 2024-08-02 LAB
ALBUMIN SERPL BCG-MCNC: 3.8 G/DL (ref 3.5–5)
ALP SERPL-CCNC: 55 U/L (ref 34–104)
ALT SERPL W P-5'-P-CCNC: 19 U/L (ref 7–52)
ANION GAP SERPL CALCULATED.3IONS-SCNC: 7 MMOL/L (ref 4–13)
AST SERPL W P-5'-P-CCNC: 21 U/L (ref 13–39)
BACTERIA UR QL AUTO: NORMAL /HPF
BASOPHILS # BLD AUTO: 0.07 THOUSANDS/ÂΜL (ref 0–0.1)
BASOPHILS NFR BLD AUTO: 1 % (ref 0–1)
BILIRUB SERPL-MCNC: 0.35 MG/DL (ref 0.2–1)
BILIRUB UR QL STRIP: NEGATIVE
BUN SERPL-MCNC: 12 MG/DL (ref 5–25)
CALCIUM SERPL-MCNC: 9.5 MG/DL (ref 8.4–10.2)
CHLORIDE SERPL-SCNC: 104 MMOL/L (ref 96–108)
CLARITY UR: CLEAR
CO2 SERPL-SCNC: 30 MMOL/L (ref 21–32)
COLOR UR: COLORLESS
CREAT SERPL-MCNC: 0.78 MG/DL (ref 0.6–1.3)
CRP SERPL QL: <1 MG/L
EOSINOPHIL # BLD AUTO: 0.46 THOUSAND/ÂΜL (ref 0–0.61)
EOSINOPHIL NFR BLD AUTO: 6 % (ref 0–6)
ERYTHROCYTE [DISTWIDTH] IN BLOOD BY AUTOMATED COUNT: 14.8 % (ref 11.6–15.1)
ERYTHROCYTE [SEDIMENTATION RATE] IN BLOOD: 24 MM/HOUR (ref 0–29)
GFR SERPL CREATININE-BSD FRML MDRD: 82 ML/MIN/1.73SQ M
GLUCOSE SERPL-MCNC: 87 MG/DL (ref 65–140)
GLUCOSE UR STRIP-MCNC: NEGATIVE MG/DL
HCT VFR BLD AUTO: 41.8 % (ref 34.8–46.1)
HGB BLD-MCNC: 13.6 G/DL (ref 11.5–15.4)
HGB UR QL STRIP.AUTO: NEGATIVE
IMM GRANULOCYTES # BLD AUTO: 0.02 THOUSAND/UL (ref 0–0.2)
IMM GRANULOCYTES NFR BLD AUTO: 0 % (ref 0–2)
KETONES UR STRIP-MCNC: NEGATIVE MG/DL
LEUKOCYTE ESTERASE UR QL STRIP: NEGATIVE
LYMPHOCYTES # BLD AUTO: 3.39 THOUSANDS/ÂΜL (ref 0.6–4.47)
LYMPHOCYTES NFR BLD AUTO: 46 % (ref 14–44)
MCH RBC QN AUTO: 31.6 PG (ref 26.8–34.3)
MCHC RBC AUTO-ENTMCNC: 32.5 G/DL (ref 31.4–37.4)
MCV RBC AUTO: 97 FL (ref 82–98)
MONOCYTES # BLD AUTO: 0.78 THOUSAND/ÂΜL (ref 0.17–1.22)
MONOCYTES NFR BLD AUTO: 11 % (ref 4–12)
NEUTROPHILS # BLD AUTO: 2.69 THOUSANDS/ÂΜL (ref 1.85–7.62)
NEUTS SEG NFR BLD AUTO: 36 % (ref 43–75)
NITRITE UR QL STRIP: NEGATIVE
NON-SQ EPI CELLS URNS QL MICRO: NORMAL /HPF
NRBC BLD AUTO-RTO: 0 /100 WBCS
PH UR STRIP.AUTO: 6.5 [PH]
PLATELET # BLD AUTO: 285 THOUSANDS/UL (ref 149–390)
PMV BLD AUTO: 10.5 FL (ref 8.9–12.7)
POTASSIUM SERPL-SCNC: 4.2 MMOL/L (ref 3.5–5.3)
PROT SERPL-MCNC: 7.2 G/DL (ref 6.4–8.4)
PROT UR STRIP-MCNC: NEGATIVE MG/DL
RBC # BLD AUTO: 4.3 MILLION/UL (ref 3.81–5.12)
RBC #/AREA URNS AUTO: NORMAL /HPF
SODIUM SERPL-SCNC: 141 MMOL/L (ref 135–147)
SP GR UR STRIP.AUTO: 1 (ref 1–1.03)
UROBILINOGEN UR STRIP-ACNC: <2 MG/DL
WBC # BLD AUTO: 7.41 THOUSAND/UL (ref 4.31–10.16)
WBC #/AREA URNS AUTO: NORMAL /HPF

## 2024-08-02 PROCEDURE — 81001 URINALYSIS AUTO W/SCOPE: CPT

## 2024-08-02 PROCEDURE — 85652 RBC SED RATE AUTOMATED: CPT

## 2024-08-02 PROCEDURE — 36415 COLL VENOUS BLD VENIPUNCTURE: CPT

## 2024-08-02 PROCEDURE — 80053 COMPREHEN METABOLIC PANEL: CPT

## 2024-08-02 PROCEDURE — 86140 C-REACTIVE PROTEIN: CPT

## 2024-08-02 PROCEDURE — 85025 COMPLETE CBC W/AUTO DIFF WBC: CPT

## 2024-08-05 ENCOUNTER — OFFICE VISIT (OUTPATIENT)
Age: 64
End: 2024-08-05
Payer: COMMERCIAL

## 2024-08-05 VITALS
TEMPERATURE: 97.8 F | OXYGEN SATURATION: 99 % | HEIGHT: 64 IN | BODY MASS INDEX: 26.53 KG/M2 | DIASTOLIC BLOOD PRESSURE: 70 MMHG | SYSTOLIC BLOOD PRESSURE: 120 MMHG | WEIGHT: 155.4 LBS | HEART RATE: 76 BPM

## 2024-08-05 DIAGNOSIS — Z79.899 ENCOUNTER FOR LONG-TERM (CURRENT) USE OF OTHER MEDICATIONS: ICD-10-CM

## 2024-08-05 DIAGNOSIS — M79.7 FIBROMYALGIA: ICD-10-CM

## 2024-08-05 DIAGNOSIS — M15.0 PRIMARY GENERALIZED (OSTEO)ARTHRITIS: ICD-10-CM

## 2024-08-05 DIAGNOSIS — G56.01 CARPAL TUNNEL SYNDROME OF RIGHT WRIST: ICD-10-CM

## 2024-08-05 DIAGNOSIS — M35.00 SJOGREN'S SYNDROME WITHOUT EXTRAGLANDULAR INVOLVEMENT (HCC): ICD-10-CM

## 2024-08-05 DIAGNOSIS — M06.09 RHEUMATOID ARTHRITIS OF MULTIPLE SITES WITH NEGATIVE RHEUMATOID FACTOR (HCC): Primary | ICD-10-CM

## 2024-08-05 DIAGNOSIS — M81.0 SENILE OSTEOPOROSIS: ICD-10-CM

## 2024-08-05 PROCEDURE — 99214 OFFICE O/P EST MOD 30 MIN: CPT | Performed by: INTERNAL MEDICINE

## 2024-08-05 NOTE — PROGRESS NOTES
Assessment/Plan:    Rheumatoid arthritis (HCC)  Rheumatoid arthritis well-controlled with Humira and weekly methotrexate.  No sign of active inflammation or synovitis on exam at this visit.  Monitor disease activity and medication toxicity with lab work as ordered.  Monitor QuantiFERON gold yearly.  She will be due for updated QuantiFERON gold in November 2024.  Follow-up 6 months or sooner if needed.    Primary generalized (osteo)arthritis  Primary generalized osteoarthritis with occasional first CMC joint arthralgias.  The back and hip pain reported at the last visit have improved significantly.  Lumbar spine and thoracic spine films significant for age-appropriate degenerative changes with mild tobacco lumbar scoliosis.  If she did have significant low back pain, would consider physical therapy.  Encourage home exercise program as tolerated.  Continue to monitor.    Sjogren's syndrome without extraglandular involvement ([facility])  Sicca symptoms stable with eyedrops and XyliMelts. Dental visits have been stable.  Encourage continued vigilant dental hygiene.  Continue to monitor.    Fibromyalgia  Myofascial symptoms generally will controlled with Lyrica and Cymbalta.  Encouraged home exercise program as tolerated.  Monitor labs for medication toxicity.  Follow-up 6 months or sooner if needed.  Continue to monitor.    Carpal tunnel syndrome of right wrist  Patient with positive EMG for mild right carpal tunnel syndrome.  She does have occasional numbness in her hands right greater than left.  Encourage wrist splints for symptomatic relief.  Symptoms are not significant enough to consider carpal tunnel injections at this time, however, we will continue to monitor.    Senile osteoporosis  Osteoporosis treated with 3 yearly doses of IV Reclast completed in June 2022.  Prior history of Fosamax.  Encouraged her to go for updated DEXA to further evaluate need for additional doses of IV Reclast.  Continue calcium and vitamin D  supplement and home exercise program.         Problem List Items Addressed This Visit       Fibromyalgia     Myofascial symptoms generally will controlled with Lyrica and Cymbalta.  Encouraged home exercise program as tolerated.  Monitor labs for medication toxicity.  Follow-up 6 months or sooner if needed.  Continue to monitor.         Senile osteoporosis     Osteoporosis treated with 3 yearly doses of IV Reclast completed in June 2022.  Prior history of Fosamax.  Encouraged her to go for updated DEXA to further evaluate need for additional doses of IV Reclast.  Continue calcium and vitamin D supplement and home exercise program.         Rheumatoid arthritis (HCC) - Primary     Rheumatoid arthritis well-controlled with Humira and weekly methotrexate.  No sign of active inflammation or synovitis on exam at this visit.  Monitor disease activity and medication toxicity with lab work as ordered.  Monitor QuantiFERON gold yearly.  She will be due for updated QuantiFERON gold in November 2024.  Follow-up 6 months or sooner if needed.         Relevant Orders    CBC and differential    Comprehensive metabolic panel    Sedimentation rate, automated    C-reactive protein    Urinalysis with microscopic    Quantiferon TB Gold Plus Assay    Primary generalized (osteo)arthritis     Primary generalized osteoarthritis with occasional first CMC joint arthralgias.  The back and hip pain reported at the last visit have improved significantly.  Lumbar spine and thoracic spine films significant for age-appropriate degenerative changes with mild tobacco lumbar scoliosis.  If she did have significant low back pain, would consider physical therapy.  Encourage home exercise program as tolerated.  Continue to monitor.         Sjogren's syndrome without extraglandular involvement (HCC)     Sicca symptoms stable with eyedrops and XyliMelts. Dental visits have been stable.  Encourage continued vigilant dental hygiene.  Continue to monitor.          Carpal tunnel syndrome of right wrist     Patient with positive EMG for mild right carpal tunnel syndrome.  She does have occasional numbness in her hands right greater than left.  Encourage wrist splints for symptomatic relief.  Symptoms are not significant enough to consider carpal tunnel injections at this time, however, we will continue to monitor.          Other Visit Diagnoses       Encounter for long-term (current) use of other medications                    Reviewed records, labs, and imaging with the patient in detail.  Counseled patient.  Discussion regarding my findings and recommendations.  Office visit with documentation 35 min.    Subjective:      Patient ID: Emy Sher is a 63 y.o. female.    Patient with seronegative rheumatoid arthritis.  She is currently being treated with oral methotrexate and Humira injections.  Overall her rheumatoid arthritis symptoms are stable.  She has minimal stiffness in the morning.  She has no swelling in her joints.  She notes some numbness in her hands right greater than left on occasion.  She did have EMG positive for mild right carpal tunnel syndrome.  She has had pain in the base of her right thumb.  Hip and back pain reported at the last visit overall improved. She has a history of fibromyalgia with intermittent myofascial symptoms. She continues taking Lyrica and Cymbalta.  Her myofascial symptoms are generally stable however she does notice more aches and pains with weather and seasonal changes. She has a history of secondary Sjogren's with chronic dry eye symptoms.  She is using drops as needed.    She has a history of osteoporosis.  She is currently receiving IV Reclast.  She received her 3rd dose on 06/06/2022.  She has previously taken Fosamax in the past.  Her most recent DEXA scan was done on 11/12/2021.  Lumbar spine T-score-2.5 which has increased 4.2% as compared to her previous scan.  Left total hip T-score-1.7 which has increased 3.7% as  compared to her previous scan.  Left femoral neck T-score -2.2.  Right total hip T-score-1.5 which has increased 5.8% as compared to her previous scan.  Right femoral neck T-score-1.6.  Forearm T-score-1.4.  She is due for updated DEXA, however, has not yet scheduled it as requested.    Lab work dated 8/2/2024 significant for CBC unremarkable.  Sed rate 24.  CRP less than 1.0.  Calcium 9.5.  Creatinine 0.78 with estimated GFR 82.  Urinalysis benign.  Review of lab work dated 3/23/2024 significant for CBC with white blood cell count 5.09.  Absolute neutrophil count 1.74.  Eosinophils slightly high at 8%.  Sed rate 14.  CRP 1.3.  Estimated GFR 78.  Urinalysis trace protein.  Total cholesterol 176.  Triglyceride 117.  HDL 47.  .  QuantiFERON gold negative 11/7/2023.    X-rays dated 4/9/2024 of the right hip were unremarkable.  Thoracic spine films mild dextroconvex curvature with age-related degenerative changes.  LS-spine mild scoliosis with age-appropriate degenerative changes.        Allergies  No Known Allergies    Home Medications    Current Outpatient Medications:     Cholecalciferol (VITAMIN D) 2000 units tablet, Take 2 tablets by mouth daily, Disp: , Rfl:     DULoxetine (CYMBALTA) 60 mg delayed release capsule, Take 60 mg by mouth 2 (two) times a day , Disp: , Rfl:     eszopiclone (LUNESTA) 3 MG tablet, Take 3 mg by mouth daily at bedtime, Disp: , Rfl:     folic acid (FOLVITE) 1 mg tablet, TAKE ONE TABLET BY MOUTH EVERY DAY, Disp: 30 tablet, Rfl: 5    Hyrimoz 40 MG/0.4ML SOAJ, INJECT 1 PEN UNDER THE SKIN EVERY 14 DAYS, Disp: 6 mL, Rfl: 1    Magnesium 200 MG TABS, Take 1 tablet by mouth daily, Disp: , Rfl:     methotrexate 2.5 MG tablet, TAKE 8 TABLETS BY MOUTH ONCE A WEEK, Disp: 96 tablet, Rfl: 1    pregabalin (LYRICA) 100 mg capsule, Take 1 capsule by mouth 2 (two) times a day, Disp: , Rfl:     Sodium Fluoride (PreviDent 5000 Booster Plus) 1.1 % PSTE, Apply on teeth every evening as directed, Disp: 100  mL, Rfl: 3    Turmeric 450 MG CAPS, Take 1 capsule by mouth 2 (two) times a day, Disp: , Rfl:     Past Medical History  Past Medical History:   Diagnosis Date    COVID 2021    Depression     Encounter for screening mammogram for breast cancer 10/22/2018    Endometriosis     Fibromyalgia, primary     Ganglion cyst of finger of right hand 02/16/2023    Hair loss 02/17/2022    Osteoarthritis     Osteoporosis     Rheumatoid arthritis (HCC)     Scoliosis     Sjogren's syndrome (HCC)        Past Surgical History   Past Surgical History:   Procedure Laterality Date    COLONOSCOPY  2016, 2018    OOPHORECTOMY      TONSILLECTOMY      TOOTH EXTRACTION      TOTAL ABDOMINAL HYSTERECTOMY  2005    at age 44       Family History   Family History   Problem Relation Age of Onset    Hypertension Mother     Hypothyroidism Mother     Osteoporosis Mother     Pulmonary embolism Mother     Other Father         cardiac disorder    Lymphoma Father         chronic lymphoid leukemia    No Known Problems Sister     Emphysema Brother         lung    No Known Problems Son     No Known Problems Son     Allergies Son     Osteoporosis Maternal Grandmother     Thyroid disease Maternal Grandmother     Transient ischemic attack Maternal Grandmother     No Known Problems Maternal Grandfather     No Known Problems Paternal Grandmother     No Known Problems Paternal Grandfather     Thyroid cancer Maternal Aunt        The following portions of the patient's history were reviewed and updated as appropriate: allergies, current medications, past family history, past medical history, past social history, past surgical history, and problem list.    Review of Systems   Constitutional:  Negative for chills, fatigue and fever.   HENT:  Negative for hearing loss, sore throat and tinnitus.         Dry mouth on Prevident toothpaste and xylimelts  Dental exam with no new cavities   Eyes:  Negative for pain and visual disturbance.        Dry eyes   Respiratory:   "Negative for cough and shortness of breath.    Cardiovascular:  Negative for chest pain and palpitations.   Gastrointestinal:  Negative for abdominal pain, nausea and vomiting.   Genitourinary:  Negative for difficulty urinating.   Musculoskeletal:  Positive for arthralgias (Hip and back pain resolved), back pain (Better, sees muscle massage therapist) and myalgias. Negative for gait problem, joint swelling, neck pain and neck stiffness.   Skin:  Negative for rash.   Neurological:  Positive for numbness (Hands occasional) and headaches. Negative for dizziness, seizures and weakness.   Psychiatric/Behavioral:  Negative for decreased concentration and sleep disturbance.          Objective:      /70 (BP Location: Right arm, Patient Position: Sitting, Cuff Size: Adult)   Pulse 76   Temp 97.8 °F (36.6 °C) (Temporal)   Ht 5' 4.25\" (1.632 m)   Wt 70.5 kg (155 lb 6.4 oz)   LMP  (LMP Unknown)   SpO2 99%   BMI 26.47 kg/m²          Physical Exam  Constitutional:       Appearance: Normal appearance.   Cardiovascular:      Rate and Rhythm: Normal rate and regular rhythm.   Pulmonary:      Breath sounds: Normal breath sounds.   Musculoskeletal:      Comments: Neck slightly decreased lateral flexion.  Spasm noted posterior cervical and shoulder girdle muscles.  Shoulders full range of motion.  Elbows full range of motion.  Wrists full range of motion with no synovitis.  Tinel's positive bilateral wrists.  Hands squaring 1st carpometacarpal joints bilaterally with early Heberden's nodes.  No synovitis appreciated.  Ganglion cyst right index finger DIP joint.  Straight leg raising negative bilaterally.  Some spasm noted in the dorsal paraspinals and lumbosacral paraspinals.  Hips full range of motion.  Triggers bilateral trochanteric bursa.  Knees full range of motion with patellofemoral crepitus.  Ankles full range of motion.  Feet hallux valgus deformities with overlapping.  No synovitis appreciated.    Skin:     " General: Skin is warm and dry.   Neurological:      General: No focal deficit present.      Mental Status: She is alert.               This note was written in part using the assistance of the [employer] [id number] microphone system. Those portions using this system have been dictated and not read.

## 2024-08-05 NOTE — PATIENT INSTRUCTIONS
Continue same plan.  Continue lab work every 3 months.  I gave him a prescription for that.  Schedule the bone density.  When I get it we will be in contact with you regarding the results.  Continue home exercise program as tolerated.

## 2024-10-28 NOTE — ASSESSMENT & PLAN NOTE
Myofascial symptoms generally will controlled with Lyrica and Cymbalta.  Encouraged home exercise program as tolerated.  Monitor labs for medication toxicity.  Follow-up 6 months or sooner if needed.  Continue to monitor.

## 2024-10-28 NOTE — ASSESSMENT & PLAN NOTE
Rheumatoid arthritis well-controlled with Humira and weekly methotrexate.  No sign of active inflammation or synovitis on exam at this visit.  Monitor disease activity and medication toxicity with lab work as ordered.  Monitor QuantiFERON gold yearly.  She will be due for updated QuantiFERON gold in November 2024.  Follow-up 6 months or sooner if needed.

## 2024-10-28 NOTE — ASSESSMENT & PLAN NOTE
Patient with positive EMG for mild right carpal tunnel syndrome.  She does have occasional numbness in her hands right greater than left.  Encourage wrist splints for symptomatic relief.  Symptoms are not significant enough to consider carpal tunnel injections at this time, however, we will continue to monitor.

## 2024-10-28 NOTE — ASSESSMENT & PLAN NOTE
Osteoporosis treated with 3 yearly doses of IV Reclast completed in June 2022.  Prior history of Fosamax.  Encouraged her to go for updated DEXA to further evaluate need for additional doses of IV Reclast.  Continue calcium and vitamin D supplement and home exercise program.

## 2024-10-28 NOTE — ASSESSMENT & PLAN NOTE
Primary generalized osteoarthritis with occasional first CMC joint arthralgias.  The back and hip pain reported at the last visit have improved significantly.  Lumbar spine and thoracic spine films significant for age-appropriate degenerative changes with mild tobacco lumbar scoliosis.  If she did have significant low back pain, would consider physical therapy.  Encourage home exercise program as tolerated.  Continue to monitor.

## 2024-12-15 ENCOUNTER — OFFICE VISIT (OUTPATIENT)
Dept: URGENT CARE | Age: 64
End: 2024-12-15
Payer: COMMERCIAL

## 2024-12-15 VITALS
OXYGEN SATURATION: 98 % | BODY MASS INDEX: 26.57 KG/M2 | TEMPERATURE: 98.5 F | HEART RATE: 104 BPM | RESPIRATION RATE: 18 BRPM | SYSTOLIC BLOOD PRESSURE: 154 MMHG | WEIGHT: 156 LBS | DIASTOLIC BLOOD PRESSURE: 90 MMHG

## 2024-12-15 DIAGNOSIS — J01.90 ACUTE NON-RECURRENT SINUSITIS, UNSPECIFIED LOCATION: Primary | ICD-10-CM

## 2024-12-15 PROCEDURE — 99203 OFFICE O/P NEW LOW 30 MIN: CPT | Performed by: STUDENT IN AN ORGANIZED HEALTH CARE EDUCATION/TRAINING PROGRAM

## 2024-12-15 NOTE — PROGRESS NOTES
[facility]        NAME: Emy Sher is a 63 y.o. female  : 1960    MRN: [id number]  DATE: December 15, 2024  TIME: 2:43 PM    Assessment and Plan   Acute non-recurrent sinusitis, unspecified location [J01.90]  1. Acute non-recurrent sinusitis, unspecified location  amoxicillin-clavulanate (AUGMENTIN) 875-125 mg per tablet            Patient Instructions   Your symptoms are likely most likely coming from a viral sinus infection.  I recommend plain Mucinex, good hydration, frequent nasal saline for the next 1 to 2 days.  If you feel that your symptoms are not improving or are continuing to worsen, please start the antibiotic.  Any antibiotic can cause side effects such as diarrhea, please take it with probiotic or yogurt.      To help with congestion, I recommend taking plain Mucinex (guaifenesin) 600 to 1200 mg every 12 hours.  It is important to take it with plenty of water.  To help clear out the mucus and reduce post-nasal drip which can cause sore throat and cough - use saline nasal spray or saline nasal rinse (with distilled or boiled water).  Use at least 2x - 3x/day.  I especially recommend using around the time of a steamy shower.  To soothe sore throat, you may try warm tea with honey, warm salt water gargles.  You may take Tylenol or Motrin to help with fever/chills or muscle aches.  Stay well hydrated and get plenty of rest.     If your symptoms are worsening or fail to improve in the coming days, please return to see us or schedule with your PCP.  If you develop any severe/worsening symptoms please go to the ER.      Chief Complaint     Chief Complaint   Patient presents with    Cold Like Symptoms     Cough, body aches, congestion and fatigue x 6 days          History of Present Illness       Patient presents for evaluation of symptoms that started out 6 days ago and have not started to improve.  She is feeling nasal/sinus congestion, postnasal drip, sore throat, cough, body aches  and fatigue.  Feels congestion more in her sinuses, not the chest.  No wheezing, shortness of breath.  No fevers, chills.          Review of Systems   Review of Systems   All other systems reviewed and are negative.        Current Medications       Current Outpatient Medications:     amoxicillin-clavulanate (AUGMENTIN) 875-125 mg per tablet, Take 1 tablet by mouth every 12 (twelve) hours for 7 days, Disp: 14 tablet, Rfl: 0    methotrexate 2.5 MG tablet, TAKE 8 TABLETS BY MOUTH ONCE A WEEK, Disp: 96 tablet, Rfl: 1    Cholecalciferol (VITAMIN D) 2000 units tablet, Take 2 tablets by mouth daily, Disp: , Rfl:     DULoxetine (CYMBALTA) 60 mg delayed release capsule, Take 60 mg by mouth 2 (two) times a day , Disp: , Rfl:     eszopiclone (LUNESTA) 3 MG tablet, Take 3 mg by mouth daily at bedtime, Disp: , Rfl:     folic acid (FOLVITE) 1 mg tablet, TAKE ONE TABLET BY MOUTH EVERY DAY, Disp: 30 tablet, Rfl: 5    Hyrimoz 40 MG/0.4ML SOAJ, INJECT 1 PEN UNDER THE SKIN EVERY 14 DAYS, Disp: 6 mL, Rfl: 1    Magnesium 200 MG TABS, Take 1 tablet by mouth daily, Disp: , Rfl:     pregabalin (LYRICA) 100 mg capsule, Take 1 capsule by mouth 2 (two) times a day, Disp: , Rfl:     Sodium Fluoride (PreviDent 5000 Booster Plus) 1.1 % PSTE, Apply on teeth every evening as directed, Disp: 100 mL, Rfl: 3    Turmeric 450 MG CAPS, Take 1 capsule by mouth 2 (two) times a day, Disp: , Rfl:     Current Allergies     Allergies as of 12/15/2024    (No Known Allergies)            The following portions of the patient's history were reviewed and updated as appropriate: allergies, current medications, past family history, past medical history, past social history, past surgical history and problem list.     Past Medical History:   Diagnosis Date    COVID 2021    Depression     Encounter for screening mammogram for breast cancer 10/22/2018    Endometriosis     Fibromyalgia, primary     Ganglion cyst of finger of right hand 02/16/2023    Hair loss 02/17/2022     Osteoarthritis     Osteoporosis     Rheumatoid arthritis (HCC)     Scoliosis     Sjogren's syndrome (HCC)        Past Surgical History:   Procedure Laterality Date    COLONOSCOPY  2016, 2018    OOPHORECTOMY      TONSILLECTOMY      TOOTH EXTRACTION      TOTAL ABDOMINAL HYSTERECTOMY  2005    at age 44       Family History   Problem Relation Age of Onset    Hypertension Mother     Hypothyroidism Mother     Osteoporosis Mother     Pulmonary embolism Mother     Other Father         cardiac disorder    Lymphoma Father         chronic lymphoid leukemia    No Known Problems Sister     Emphysema Brother         lung    No Known Problems Son     No Known Problems Son     Allergies Son     Osteoporosis Maternal Grandmother     Thyroid disease Maternal Grandmother     Transient ischemic attack Maternal Grandmother     No Known Problems Maternal Grandfather     No Known Problems Paternal Grandmother     No Known Problems Paternal Grandfather     Thyroid cancer Maternal Aunt          Medications have been verified.        Objective   /90 (BP Location: Left arm, Patient Position: Sitting, Cuff Size: Adult)   Pulse 104   Temp 98.5 °F (36.9 °C) (Tympanic)   Resp 18   Wt 70.8 kg (156 lb)   LMP  (LMP Unknown)   SpO2 98%   BMI 26.57 kg/m²   No LMP recorded (lmp unknown). Patient has had a hysterectomy.       Physical Exam     Physical Exam  Vitals and nursing note reviewed.   Constitutional:       General: She is not in acute distress.     Appearance: Normal appearance. She is not ill-appearing or toxic-appearing.   HENT:      Head: Normocephalic and atraumatic.      Right Ear: Tympanic membrane, ear canal and external ear normal.      Left Ear: Tympanic membrane, ear canal and external ear normal.      Ears:      Comments: Left TM with clear effusion, no erythema, no bulging     Nose: Congestion present.      Comments: Significant congestion, PND, throat clearing     Mouth/Throat:      Mouth: Mucous membranes are  moist.      Comments: Posterior cobblestoning, erythema, irritation  Eyes:      Extraocular Movements: Extraocular movements intact.      Conjunctiva/sclera: Conjunctivae normal.   Cardiovascular:      Rate and Rhythm: Normal rate and regular rhythm.      Heart sounds: Normal heart sounds.   Pulmonary:      Effort: Pulmonary effort is normal. No respiratory distress.      Breath sounds: Normal breath sounds. No stridor. No wheezing, rhonchi or rales.   Skin:     General: Skin is warm and dry.      Capillary Refill: Capillary refill takes less than 2 seconds.      Findings: No rash.   Neurological:      Mental Status: She is alert.      Gait: Gait normal.   Psychiatric:         Behavior: Behavior normal.

## 2024-12-15 NOTE — PATIENT INSTRUCTIONS
Your symptoms are likely most likely coming from a viral sinus infection.  I recommend plain Mucinex, good hydration, frequent nasal saline for the next 1 to 2 days.  If you feel that your symptoms are not improving or are continuing to worsen, please start the antibiotic.  Any antibiotic can cause side effects such as diarrhea, please take it with probiotic or yogurt.      To help with congestion, I recommend taking plain Mucinex (guaifenesin) 600 to 1200 mg every 12 hours.  It is important to take it with plenty of water.  To help clear out the mucus and reduce post-nasal drip which can cause sore throat and cough - use saline nasal spray or saline nasal rinse (with distilled or boiled water).  Use at least 2x - 3x/day.  I especially recommend using around the time of a steamy shower.  To soothe sore throat, you may try warm tea with honey, warm salt water gargles.  You may take Tylenol or Motrin to help with fever/chills or muscle aches.  Stay well hydrated and get plenty of rest.     If your symptoms are worsening or fail to improve in the coming days, please return to see us or schedule with your PCP.  If you develop any severe/worsening symptoms please go to the ER.      Nasal Saline Rinse:      
no fever/no chills/no sweating/no anorexia/no weight loss/no weight gain/no polyphagia/no polyuria/no polydipsia/no malaise/no fatigue

## 2025-01-20 ENCOUNTER — TELEPHONE (OUTPATIENT)
Age: 65
End: 2025-01-20

## 2025-01-20 NOTE — TELEPHONE ENCOUNTER
[facility] calling regarding the script they received on Hyrimoz. They stated they needed clarification as the directions and the dispense quantity didn't match.     They got  Janeth pharmacist on the line. The boxes come in a quantity of 2 instead of 1 pen if that would be fine to dispense instead. Informed that is fine. They do not need an updated script or anything.

## 2025-01-22 ENCOUNTER — OFFICE VISIT (OUTPATIENT)
Age: 65
End: 2025-01-22
Payer: COMMERCIAL

## 2025-01-22 VITALS
HEART RATE: 102 BPM | WEIGHT: 156 LBS | RESPIRATION RATE: 16 BRPM | SYSTOLIC BLOOD PRESSURE: 122 MMHG | DIASTOLIC BLOOD PRESSURE: 70 MMHG | TEMPERATURE: 97.9 F | BODY MASS INDEX: 26.63 KG/M2 | HEIGHT: 64 IN | OXYGEN SATURATION: 99 %

## 2025-01-22 DIAGNOSIS — Z13.6 SCREENING FOR CARDIOVASCULAR CONDITION: ICD-10-CM

## 2025-01-22 DIAGNOSIS — Z12.12 SCREENING FOR COLORECTAL CANCER: ICD-10-CM

## 2025-01-22 DIAGNOSIS — Z00.00 ANNUAL PHYSICAL EXAM: Primary | ICD-10-CM

## 2025-01-22 DIAGNOSIS — Z12.11 SCREENING FOR COLORECTAL CANCER: ICD-10-CM

## 2025-01-22 DIAGNOSIS — Z23 NEED FOR COVID-19 VACCINE: ICD-10-CM

## 2025-01-22 DIAGNOSIS — F41.9 ANXIETY: ICD-10-CM

## 2025-01-22 DIAGNOSIS — E55.9 VITAMIN D DEFICIENCY: ICD-10-CM

## 2025-01-22 DIAGNOSIS — Z23 ENCOUNTER FOR IMMUNIZATION: ICD-10-CM

## 2025-01-22 PROCEDURE — 91320 SARSCV2 VAC 30MCG TRS-SUC IM: CPT | Performed by: INTERNAL MEDICINE

## 2025-01-22 PROCEDURE — 90673 RIV3 VACCINE NO PRESERV IM: CPT | Performed by: INTERNAL MEDICINE

## 2025-01-22 PROCEDURE — 99396 PREV VISIT EST AGE 40-64: CPT | Performed by: INTERNAL MEDICINE

## 2025-01-22 PROCEDURE — 90471 IMMUNIZATION ADMIN: CPT | Performed by: INTERNAL MEDICINE

## 2025-01-22 PROCEDURE — 90480 ADMN SARSCOV2 VAC 1/ONLY CMP: CPT | Performed by: INTERNAL MEDICINE

## 2025-01-22 NOTE — ASSESSMENT & PLAN NOTE
-relates to recent work and personal events  -continue counseling  -follow up with psychiatrist for med management  Orders:  •  TSH, 3rd generation with Free T4 reflex; Future

## 2025-01-22 NOTE — PROGRESS NOTES
Adult Annual Physical  Name: Emy Sher      : 1960      MRN: [id number]  Encounter Provider: Frances Hopkins DO  Encounter Date: 2025   Encounter department: [facility] INTERNAL MEDICINE    Assessment & Plan  Annual physical exam         Screening for colorectal cancer    Orders:  •  Ambulatory referral to Colorectal Surgery; Future    Screening for cardiovascular condition    Orders:  •  Lipid panel; Future    Encounter for immunization    Orders:  •  influenza vaccine, recombinant, PF, 0.5 mL IM (Flublok)    Need for COVID-19 vaccine    Orders:  •  COVID-19 Pfizer mRNA vaccine 12 yr and older (Comirnaty pre-filled syringe)    Anxiety  -relates to recent work and personal events  -continue counseling  -follow up with psychiatrist for med management  Orders:  •  TSH, 3rd generation with Free T4 reflex; Future    Vitamin D deficiency    Orders:  •  Vitamin D 25 hydroxy; Future    Immunizations and preventive care screenings were discussed with patient today. Appropriate education was printed on patient's after visit summary.    Counseling:  Alcohol/drug use: discussed moderation in alcohol intake, the recommendations for healthy alcohol use, and avoidance of illicit drug use.  Dental Health: discussed importance of regular tooth brushing, flossing, and dental visits.  Exercise: the importance of regular exercise/physical activity was discussed. Recommend exercise 3-5 times per week for at least 30 minutes.   Immunizations discussed.  Influenza and updated COVID vaccine due today.  Consider RSV vaccine.  Cancer screenings discussed.  Due for colon cancer screen, will refer for colonoscopy.  Mammo due next month.  PAP per GYN.      Depression Screening and Follow-up Plan: Patient was screened for depression during today's encounter. They screened negative with a PHQ-9 score of 4.        History of Present Illness     Adult Annual Physical:  Patient presents for annual physical. Went  to [facility] that had a hearing exhibit, she noticed that her R hearing was decreased.      Work has offered therapy sessions for anxiety.    Her brother in [place] passed away and her brother in law passed away as well..     Diet and Physical Activity:  - Diet/Nutrition:. regular diet  - Exercise: no formal exercise.    Depression Screening:    - PHQ-9 Score: 4    General Health:  - Sleep: sleeps poorly and > 8 hours of sleep on average. delayed sleep onset, sometimes interrupted.  - Hearing: decreased hearing right ear.  - Vision: goes for regular eye exams and wears glasses.  - Dental: regular dental visits.    /GYN Health:  - Follows with GYN: yes.   - Menopause: postmenopausal.     Review of Systems   HENT:  Positive for congestion, hearing loss, postnasal drip and rhinorrhea.    Respiratory:  Negative for cough, shortness of breath and wheezing.    Gastrointestinal:  Positive for constipation. Negative for abdominal pain, diarrhea, nausea and vomiting.   Genitourinary:         Stress incontinence   Musculoskeletal:  Positive for arthralgias. Negative for joint swelling.   Neurological:  Positive for dizziness (positional), numbness (infrequent) and headaches (tension).   Psychiatric/Behavioral:  Positive for dysphoric mood and sleep disturbance. The patient is nervous/anxious.      Medical History Reviewed by provider this encounter:  Tobacco  Allergies  Meds  Problems  Med Hx  Surg Hx  Fam Hx  Soc   Hx    .  Current Outpatient Medications on File Prior to Visit   Medication Sig Dispense Refill   • Cholecalciferol (VITAMIN D) 2000 units tablet Take 2 tablets by mouth daily     • DULoxetine (CYMBALTA) 60 mg delayed release capsule Take 60 mg by mouth 2 (two) times a day      • eszopiclone (LUNESTA) 3 MG tablet Take 3 mg by mouth daily at bedtime     • folic acid (FOLVITE) 1 mg tablet TAKE ONE TABLET BY MOUTH EVERY DAY 30 tablet 5   • Hyrimoz 40 MG/0.4ML SOAJ INJECT 1 PEN UNDER THE SKIN EVERY 14 DAYS  "0.4 mL 5   • Magnesium 200 MG TABS Take 1 tablet by mouth daily     • methotrexate 2.5 MG tablet TAKE 8 TABLETS BY MOUTH ONCE A WEEK 96 tablet 1   • pregabalin (LYRICA) 100 mg capsule Take 1 capsule by mouth 2 (two) times a day     • Sodium Fluoride (PreviDent 5000 Booster Plus) 1.1 % PSTE Apply on teeth every evening as directed 100 mL 3   • Turmeric 450 MG CAPS Take 1 capsule by mouth 2 (two) times a day       No current facility-administered medications on file prior to visit.      Social History     Tobacco Use   • Smoking status: Never   • Smokeless tobacco: Never   Vaping Use   • Vaping status: Never Used   Substance and Sexual Activity   • Alcohol use: Yes     Comment: I have 2-3 standard drinks per month   • Drug use: Never   • Sexual activity: Not Currently     Partners: Male     Birth control/protection: Surgical, None     Comment: x 33 years       Objective   /70 (BP Location: Left arm, Patient Position: Sitting)   Pulse 102   Temp 97.9 °F (36.6 °C) (Tympanic)   Resp 16   Ht 5' 4.25\" (1.632 m)   Wt 70.8 kg (156 lb)   LMP  (LMP Unknown)   SpO2 99%   BMI 26.57 kg/m²     Physical Exam  Vitals reviewed.   Constitutional:       General: She is not in acute distress.  HENT:      Head: Normocephalic.      Right Ear: Tympanic membrane and ear canal normal.      Left Ear: Tympanic membrane and ear canal normal.      Ears:      Comments: Able to hear BL fingers rubs     Nose: Nose normal.      Mouth/Throat:      Mouth: Mucous membranes are moist.   Eyes:      Extraocular Movements: Extraocular movements intact.      Conjunctiva/sclera: Conjunctivae normal.      Pupils: Pupils are equal, round, and reactive to light.      Comments: Wears glasses   Neck:      Thyroid: No thyromegaly or thyroid tenderness.   Cardiovascular:      Rate and Rhythm: Regular rhythm. Tachycardia present.      Pulses: Normal pulses.      Heart sounds: Normal heart sounds.   Pulmonary:      Effort: Pulmonary effort is normal. No " respiratory distress.      Breath sounds: Normal breath sounds. No wheezing.   Chest:      Comments: Breast exam deferred  Abdominal:      General: Bowel sounds are normal. There is no distension.      Palpations: Abdomen is soft.      Tenderness: There is no abdominal tenderness.   Musculoskeletal:      Cervical back: Neck supple. No tenderness.      Right lower leg: No edema.      Left lower leg: No edema.   Lymphadenopathy:      Cervical: No cervical adenopathy.   Skin:     Coloration: Skin is not pale.   Neurological:      Mental Status: She is alert and oriented to person, place, and time.   Psychiatric:         Mood and Affect: Mood normal.         Recent Results (from the past 26 weeks)   CBC and differential    Collection Time: 08/02/24 12:57 PM   Result Value Ref Range    WBC 7.41 4.31 - 10.16 Thousand/uL    RBC 4.30 3.81 - 5.12 Million/uL    Hemoglobin 13.6 11.5 - 15.4 g/dL    Hematocrit 41.8 34.8 - 46.1 %    MCV 97 82 - 98 fL    MCH 31.6 26.8 - 34.3 pg    MCHC 32.5 31.4 - 37.4 g/dL    RDW 14.8 11.6 - 15.1 %    MPV 10.5 8.9 - 12.7 fL    Platelets 285 149 - 390 Thousands/uL    nRBC 0 /100 WBCs    Segmented % 36 (L) 43 - 75 %    Immature Grans % 0 0 - 2 %    Lymphocytes % 46 (H) 14 - 44 %    Monocytes % 11 4 - 12 %    Eosinophils Relative 6 0 - 6 %    Basophils Relative 1 0 - 1 %    Absolute Neutrophils 2.69 1.85 - 7.62 Thousands/µL    Absolute Immature Grans 0.02 0.00 - 0.20 Thousand/uL    Absolute Lymphocytes 3.39 0.60 - 4.47 Thousands/µL    Absolute Monocytes 0.78 0.17 - 1.22 Thousand/µL    Eosinophils Absolute 0.46 0.00 - 0.61 Thousand/µL    Basophils Absolute 0.07 0.00 - 0.10 Thousands/µL   Comprehensive metabolic panel    Collection Time: 08/02/24 12:57 PM   Result Value Ref Range    Sodium 141 135 - 147 mmol/L    Potassium 4.2 3.5 - 5.3 mmol/L    Chloride 104 96 - 108 mmol/L    CO2 30 21 - 32 mmol/L    ANION GAP 7 4 - 13 mmol/L    BUN 12 5 - 25 mg/dL    Creatinine 0.78 0.60 - 1.30 mg/dL    Glucose 87  65 - 140 mg/dL    Calcium 9.5 8.4 - 10.2 mg/dL    AST 21 13 - 39 U/L    ALT 19 7 - 52 U/L    Alkaline Phosphatase 55 34 - 104 U/L    Total Protein 7.2 6.4 - 8.4 g/dL    Albumin 3.8 3.5 - 5.0 g/dL    Total Bilirubin 0.35 0.20 - 1.00 mg/dL    eGFR 82 ml/min/1.73sq m   Sedimentation rate, automated    Collection Time: 08/02/24 12:57 PM   Result Value Ref Range    Sed Rate 24 0 - 29 mm/hour   C-reactive protein    Collection Time: 08/02/24 12:57 PM   Result Value Ref Range    CRP <1.0 <3.0 mg/L   Urinalysis with microscopic    Collection Time: 08/02/24 12:57 PM   Result Value Ref Range    Color, UA Colorless     Clarity, UA Clear     Specific Gravity, UA 1.005 1.003 - 1.030    pH, UA 6.5 4.5, 5.0, 5.5, 6.0, 6.5, 7.0, 7.5, 8.0    Leukocytes, UA Negative Negative    Nitrite, UA Negative Negative    Protein, UA Negative Negative mg/dl    Glucose, UA Negative Negative mg/dl    Ketones, UA Negative Negative mg/dl    Urobilinogen, UA <2.0 <2.0 mg/dl mg/dl    Bilirubin, UA Negative Negative    Occult Blood, UA Negative Negative    RBC, UA None Seen None Seen, 1-2 /hpf    WBC, UA None Seen None Seen, 1-2 /hpf    Epithelial Cells None Seen None Seen, Occasional /hpf    Bacteria, UA None Seen None Seen, Occasional /hpf

## 2025-01-22 NOTE — PATIENT INSTRUCTIONS
"Patient Education     Routine physical for adults   The Basics   Written by the doctors and editors at [facility]   What is a physical? -- A physical is a routine visit, or \"check-up,\" with your doctor. You might also hear it called a \"wellness visit\" or \"preventive visit.\"  During each visit, the doctor will:   Ask about your physical and mental health   Ask about your habits, behaviors, and lifestyle   Do an exam   Give you vaccines if needed   Talk to you about any medicines you take   Give advice about your health   Answer your questions  Getting regular check-ups is an important part of taking care of your health. It can help your doctor find and treat any problems you have. But it's also important for preventing health problems.  A routine physical is different from a \"sick visit.\" A sick visit is when you see a doctor because of a health concern or problem. Since physicals are scheduled ahead of time, you can think about what you want to ask the doctor.  How often should I get a physical? -- It depends on your age and health. In general, for people age 21 years and older:   If you are younger than 50 years, you might be able to get a physical every 3 years.   If you are 50 years or older, your doctor might recommend a physical every year.  If you have an ongoing health condition, like diabetes or high blood pressure, your doctor will probably want to see you more often.  What happens during a physical? -- In general, each visit will include:   Physical exam - The doctor or nurse will check your height, weight, heart rate, and blood pressure. They will also look at your eyes and ears. They will ask about how you are feeling and whether you have any symptoms that bother you.   Medicines - It's a good idea to bring a list of all the medicines you take to each doctor visit. Your doctor will talk to you about your medicines and answer any questions. Tell them if you are having any side effects that bother you. You " "should also tell them if you are having trouble paying for any of your medicines.   Habits and behaviors - This includes:   Your diet   Your exercise habits   Whether you smoke, drink alcohol, or use drugs   Whether you are sexually active   Whether you feel safe at home  Your doctor will talk to you about things you can do to improve your health and lower your risk of health problems. They will also offer help and support. For example, if you want to quit smoking, they can give you advice and might prescribe medicines. If you want to improve your diet or get more physical activity, they can help you with this, too.   Lab tests, if needed - The tests you get will depend on your age and situation. For example, your doctor might want to check your:   Cholesterol   Blood sugar   Iron level   Vaccines - The recommended vaccines will depend on your age, health, and what vaccines you already had. Vaccines are very important because they can prevent certain serious or deadly infections.   Discussion of screening - \"Screening\" means checking for diseases or other health problems before they cause symptoms. Your doctor can recommend screening based on your age, risk, and preferences. This might include tests to check for:   Cancer, such as breast, prostate, cervical, ovarian, colorectal, prostate, lung, or skin cancer   Sexually transmitted infections, such as chlamydia and gonorrhea   Mental health conditions like depression and anxiety  Your doctor will talk to you about the different types of screening tests. They can help you decide which screenings to have. They can also explain what the results might mean.   Answering questions - The physical is a good time to ask the doctor or nurse questions about your health. If needed, they can refer you to other doctors or specialists, too.  Adults older than 65 years often need other care, too. As you get older, your doctor will talk to you about:   How to prevent falling at " home   Hearing or vision tests   Memory testing   How to take your medicines safely   Making sure that you have the help and support you need at home  All topics are updated as new evidence becomes available and our peer review process is complete.  This topic retrieved from [employer] on: May 02, 2024.  Topic 645241 Version 1.0  Release: 32.4.3 - C32.122  © 2024 UpToDate, Inc. and/or its affiliates. All rights reserved.  Consumer Information Use and Disclaimer   Disclaimer: This generalized information is a limited summary of diagnosis, treatment, and/or medication information. It is not meant to be comprehensive and should be used as a tool to help the user understand and/or assess potential diagnostic and treatment options. It does NOT include all information about conditions, treatments, medications, side effects, or risks that may apply to a specific patient. It is not intended to be medical advice or a substitute for the medical advice, diagnosis, or treatment of a health care provider based on the health care provider's examination and assessment of a patient's specific and unique circumstances. Patients must speak with a health care provider for complete information about their health, medical questions, and treatment options, including any risks or benefits regarding use of medications. This information does not endorse any treatments or medications as safe, effective, or approved for treating a specific patient. UpToDate, Inc. and its affiliates disclaim any warranty or liability relating to this information or the use thereof.The use of this information is governed by the Terms of Use, available at https://www.woltersTweetMySong.comuwer.com/en/know/clinical-effectiveness-terms. 2024© UpToDate, Inc. and its affiliates and/or licensors. All rights reserved.  Copyright   © 2024 UpToDate, Inc. and/or its affiliates. All rights reserved.

## 2025-01-23 ENCOUNTER — TELEPHONE (OUTPATIENT)
Age: 65
End: 2025-01-23

## 2025-01-23 NOTE — TELEPHONE ENCOUNTER
PC to Ms. Sher; LM to speak regarding her visit with PCP; Dr. Frances Hopkins held on Wednesday, January 22, 2025.  This is pertaining to the vaccines she received in the office.

## 2025-01-25 ENCOUNTER — APPOINTMENT (OUTPATIENT)
Dept: LAB | Age: 65
End: 2025-01-25
Payer: COMMERCIAL

## 2025-01-25 DIAGNOSIS — E55.9 VITAMIN D DEFICIENCY: ICD-10-CM

## 2025-01-25 DIAGNOSIS — Z13.6 SCREENING FOR CARDIOVASCULAR CONDITION: ICD-10-CM

## 2025-01-25 DIAGNOSIS — M06.09 RHEUMATOID ARTHRITIS OF MULTIPLE SITES WITH NEGATIVE RHEUMATOID FACTOR (HCC): ICD-10-CM

## 2025-01-25 DIAGNOSIS — F41.9 ANXIETY: ICD-10-CM

## 2025-01-25 LAB
25(OH)D3 SERPL-MCNC: 101.6 NG/ML (ref 30–100)
CHOLEST SERPL-MCNC: 152 MG/DL (ref ?–200)
HDLC SERPL-MCNC: 45 MG/DL
LDLC SERPL CALC-MCNC: 90 MG/DL (ref 0–100)
NONHDLC SERPL-MCNC: 107 MG/DL
TRIGL SERPL-MCNC: 84 MG/DL (ref ?–150)
TSH SERPL DL<=0.05 MIU/L-ACNC: 0.9 UIU/ML (ref 0.45–4.5)

## 2025-01-25 PROCEDURE — 80061 LIPID PANEL: CPT

## 2025-01-25 PROCEDURE — 84443 ASSAY THYROID STIM HORMONE: CPT

## 2025-01-25 PROCEDURE — 82306 VITAMIN D 25 HYDROXY: CPT

## 2025-01-25 PROCEDURE — 36415 COLL VENOUS BLD VENIPUNCTURE: CPT

## 2025-01-25 PROCEDURE — 86480 TB TEST CELL IMMUN MEASURE: CPT

## 2025-01-26 ENCOUNTER — RESULTS FOLLOW-UP (OUTPATIENT)
Age: 65
End: 2025-01-26

## 2025-01-26 LAB
GAMMA INTERFERON BACKGROUND BLD IA-ACNC: 0.06 IU/ML
M TB IFN-G BLD-IMP: NEGATIVE
M TB IFN-G CD4+ BCKGRND COR BLD-ACNC: 0.04 IU/ML
M TB IFN-G CD4+ BCKGRND COR BLD-ACNC: 0.04 IU/ML
MITOGEN IGNF BCKGRD COR BLD-ACNC: 9.94 IU/ML

## 2025-01-29 ENCOUNTER — RESULTS FOLLOW-UP (OUTPATIENT)
Age: 65
End: 2025-01-29

## 2025-02-07 ENCOUNTER — TELEPHONE (OUTPATIENT)
Age: 65
End: 2025-02-07

## 2025-02-07 NOTE — TELEPHONE ENCOUNTER
PA for Hyrimoz SUBMITTED to [facility]    via    []CMM-KEY:    [x]Surescripts-Case ID # [id number]   []Availity-Auth ID #  NDC #    []Faxed to plan   []Other website    []Phone call Case ID #      [x]PA sent as URGENT    All office notes, labs and other pertaining documents and studies sent. Clinical questions answered. Awaiting determination from insurance company.     Turnaround time for your insurance to make a decision on your Prior Authorization can take 7-21 business days.

## 2025-02-12 NOTE — TELEPHONE ENCOUNTER
PA for Hyrimoz  APPROVED     Date(s) approved February 7, 2026     Case #     Patient advised by          []MyChart Message  [x]Phone call   []LMOM  []L/M to call office as no active Communication consent on file  []Unable to leave detailed message as VM not approved on Communication consent       Pharmacy advised by    [x]Fax  []Phone call    Approval letter scanned into [employer] Yes

## 2025-02-13 ENCOUNTER — HOSPITAL ENCOUNTER (OUTPATIENT)
Dept: RADIOLOGY | Age: 65
Discharge: HOME | End: 2025-02-13
Payer: COMMERCIAL

## 2025-02-13 VITALS — BODY MASS INDEX: 26.63 KG/M2 | WEIGHT: 156 LBS | HEIGHT: 64 IN

## 2025-02-13 DIAGNOSIS — Z12.31 SCREENING MAMMOGRAM, ENCOUNTER FOR: ICD-10-CM

## 2025-02-13 PROCEDURE — 77067 SCR MAMMO BI INCL CAD: CPT

## 2025-02-13 PROCEDURE — 77063 BREAST TOMOSYNTHESIS BI: CPT

## 2025-02-14 ENCOUNTER — RESULTS FOLLOW-UP (OUTPATIENT)
Dept: OBGYN CLINIC | Facility: CLINIC | Age: 65
End: 2025-02-14

## 2025-03-24 ENCOUNTER — OFFICE VISIT (OUTPATIENT)
Age: 65
End: 2025-03-24
Payer: COMMERCIAL

## 2025-03-24 VITALS
DIASTOLIC BLOOD PRESSURE: 76 MMHG | OXYGEN SATURATION: 97 % | SYSTOLIC BLOOD PRESSURE: 118 MMHG | HEART RATE: 72 BPM | WEIGHT: 157.2 LBS | BODY MASS INDEX: 26.84 KG/M2 | HEIGHT: 64 IN

## 2025-03-24 DIAGNOSIS — G56.01 CARPAL TUNNEL SYNDROME OF RIGHT WRIST: ICD-10-CM

## 2025-03-24 DIAGNOSIS — M81.0 SENILE OSTEOPOROSIS: ICD-10-CM

## 2025-03-24 DIAGNOSIS — M15.0 PRIMARY GENERALIZED (OSTEO)ARTHRITIS: ICD-10-CM

## 2025-03-24 DIAGNOSIS — M18.12 PRIMARY OSTEOARTHRITIS OF FIRST CARPOMETACARPAL JOINT OF LEFT HAND: ICD-10-CM

## 2025-03-24 DIAGNOSIS — M18.0 PRIMARY OSTEOARTHRITIS OF BOTH FIRST CARPOMETACARPAL JOINTS: ICD-10-CM

## 2025-03-24 DIAGNOSIS — M06.09 RHEUMATOID ARTHRITIS OF MULTIPLE SITES WITH NEGATIVE RHEUMATOID FACTOR (HCC): Primary | ICD-10-CM

## 2025-03-24 DIAGNOSIS — M79.7 FIBROMYALGIA: ICD-10-CM

## 2025-03-24 DIAGNOSIS — M35.00 SJOGREN'S SYNDROME WITHOUT EXTRAGLANDULAR INVOLVEMENT (HCC): ICD-10-CM

## 2025-03-24 PROCEDURE — 99214 OFFICE O/P EST MOD 30 MIN: CPT | Performed by: INTERNAL MEDICINE

## 2025-03-24 PROCEDURE — 20600 DRAIN/INJ JOINT/BURSA W/O US: CPT | Performed by: INTERNAL MEDICINE

## 2025-03-24 RX ADMIN — METHYLPREDNISOLONE ACETATE 1 ML: 40 INJECTION, SUSPENSION INTRA-ARTICULAR; INTRALESIONAL; INTRAMUSCULAR; SOFT TISSUE at 16:30

## 2025-03-24 NOTE — PATIENT INSTRUCTIONS
Ice your thumb where I injected it.  I did give you a prescription for occupational therapy to use when you can find a time.  Continue medicines as before.  Continue lab work every 3 months.  I will give you a new slip for that.  Try and do some gentle home exercise program because that will help your body as well as your head.

## 2025-03-24 NOTE — PROGRESS NOTES
Assessment/Plan:    Rheumatoid arthritis (HCC)  Rheumatoid arthritis well-controlled with Humira and weekly methotrexate.  No sign of active inflammation or synovitis on exam at this visit.  Continue to monitor her clinical response to treatment.  Monitor disease activity and medication toxicity with lab work as ordered.  Monitor QuantiFERON gold yearly.  She will be due for updated QuantiFERON gold in January 2026.  Follow-up 6 months or sooner if needed.    Sjogren's syndrome without extraglandular involvement (HCC)  Probable secondary Sjogren syndrome with sicca symptoms stable with eyedrops for dry eyes and PreviDent toothpaste and XyliMelts for xerostomia. Dental visits have been stable.  Encourage continued vigilant dental hygiene and follow-up with dentist every 6 months or sooner if needed.  Continue to monitor.    Primary generalized (osteo)arthritis  Primary generalized osteoarthritis with first CMC joint arthralgias, left greater than right, with significant increase in left basal thumb joint pain.  See procedure note.  No current complaints of back or hip pain.  Review of lumbar spine and thoracic spine films significant for age-appropriate degenerative changes with mild thoracolumbar scoliosis. Would consider physical therapy if she had a return of hip and back pain.  Encourage home exercise program as tolerated.  Continue to monitor.    Primary osteoarthritis of both first carpometacarpal joints  First CMC osteoarthritis left greater than right with increased pain left basal thumb joint.  Patient is requesting steroid injection at this visit.  See procedure note.  Patient was given a prescription for occupational therapy for treatment of first CMC osteoarthritis.  She would benefit with thumb spica splints.  Continue to monitor.    Primary osteoarthritis of first carpometacarpal joint of left hand  Increased pain left first CMC joint with marked tenderness on exam at this visit.  Patient requesting  steroid injection.  See procedure note.  She was referred to occupational therapy for treatment of CMC osteoarthritis.  She would benefit with thumb spica splints.  Continue to monitor.    Carpal tunnel syndrome of right wrist  History of positive EMG for mild right carpal tunnel syndrome.  She does have occasional numbness in her hands right greater than left, not significant enough to consider carpal tunnel injections.  Encourage wrist splints for symptomatic relief.  Continue to monitor.    Senile osteoporosis  Osteoporosis treated with 3 yearly doses of IV Reclast completed in June 2022.  Prior history of Fosamax.  Most recent DEXA dated 11/12/2021 significant for lumbar spine T-score -2.5 with an increase of 4.2% as compared to the prior study.  Left hip total T-score -1.7 with an increase of 3.7% as compared to the prior study.  Left femoral neck T-score -2.2.  Right total hip T-score -1.5 with an increase of 5.8% skin prior study.  Right femoral neck T-score -1.6.  Forearm T-score -1.4.  She is overdue for updated DEXA but has not yet scheduled this requested.  Encouraged her to go for updated DEXA to further evaluate need for additional doses of IV Reclast.  Continue calcium and vitamin D supplement and home exercise program.  Continue to monitor.    Fibromyalgia  Myofascial symptoms generally stable with Lyrica and Cymbalta.  Encouraged home exercise program as tolerated.  Monitor labs for medication toxicity.  Follow-up 6 months or sooner if needed.  Continue to monitor.         Problem List Items Addressed This Visit     Fibromyalgia    Myofascial symptoms generally stable with Lyrica and Cymbalta.  Encouraged home exercise program as tolerated.  Monitor labs for medication toxicity.  Follow-up 6 months or sooner if needed.  Continue to monitor.         Senile osteoporosis    Osteoporosis treated with 3 yearly doses of IV Reclast completed in June 2022.  Prior history of Fosamax.  Most recent DEXA dated  11/12/2021 significant for lumbar spine T-score -2.5 with an increase of 4.2% as compared to the prior study.  Left hip total T-score -1.7 with an increase of 3.7% as compared to the prior study.  Left femoral neck T-score -2.2.  Right total hip T-score -1.5 with an increase of 5.8% skin prior study.  Right femoral neck T-score -1.6.  Forearm T-score -1.4.  She is overdue for updated DEXA but has not yet scheduled this requested.  Encouraged her to go for updated DEXA to further evaluate need for additional doses of IV Reclast.  Continue calcium and vitamin D supplement and home exercise program.  Continue to monitor.         Rheumatoid arthritis (HCC) - Primary    Rheumatoid arthritis well-controlled with Humira and weekly methotrexate.  No sign of active inflammation or synovitis on exam at this visit.  Continue to monitor her clinical response to treatment.  Monitor disease activity and medication toxicity with lab work as ordered.  Monitor QuantiFERON gold yearly.  She will be due for updated QuantiFERON gold in January 2026.  Follow-up 6 months or sooner if needed.         Relevant Orders    CBC and differential    Comprehensive metabolic panel    Sedimentation rate, automated    C-reactive protein    Urinalysis with microscopic    Primary generalized (osteo)arthritis    Primary generalized osteoarthritis with first CMC joint arthralgias, left greater than right, with significant increase in left basal thumb joint pain.  See procedure note.  No current complaints of back or hip pain.  Review of lumbar spine and thoracic spine films significant for age-appropriate degenerative changes with mild thoracolumbar scoliosis. Would consider physical therapy if she had a return of hip and back pain.  Encourage home exercise program as tolerated.  Continue to monitor.         Sjogren's syndrome without extraglandular involvement (HCC)    Probable secondary Sjogren syndrome with sicca symptoms stable with eyedrops for dry  eyes and PreviDent toothpaste and XyliMelts for xerostomia. Dental visits have been stable.  Encourage continued vigilant dental hygiene and follow-up with dentist every 6 months or sooner if needed.  Continue to monitor.         Carpal tunnel syndrome of right wrist    History of positive EMG for mild right carpal tunnel syndrome.  She does have occasional numbness in her hands right greater than left, not significant enough to consider carpal tunnel injections.  Encourage wrist splints for symptomatic relief.  Continue to monitor.         Primary osteoarthritis of first carpometacarpal joint of left hand    Increased pain left first CMC joint with marked tenderness on exam at this visit.  Patient requesting steroid injection.  See procedure note.  She was referred to occupational therapy for treatment of CMC osteoarthritis.  She would benefit with thumb spica splints.  Continue to monitor.         Relevant Orders    Small joint arthrocentesis: L thumb CMC (Completed)    Primary osteoarthritis of both first carpometacarpal joints    First CMC osteoarthritis left greater than right with increased pain left basal thumb joint.  Patient is requesting steroid injection at this visit.  See procedure note.  Patient was given a prescription for occupational therapy for treatment of first CMC osteoarthritis.  She would benefit with thumb spica splints.  Continue to monitor.         Relevant Orders    Ambulatory Referral to Occupational Therapy             Reviewed records, labs, and imaging with the patient in detail.  Counseled patient.  Discussion regarding my findings and recommendations.  Office visit with documentation 35 min.    Subjective:      Patient ID: Emy Sher is a 64 y.o. female.    Patient with seronegative rheumatoid arthritis who continues on oral methotrexate and Humira injections.  Overall her rheumatoid arthritis symptoms are stable.  She has minimal stiffness in the morning and no joint swelling.   She notes some numbness in her hands right greater than left on occasion.  She did have EMG positive for mild right carpal tunnel syndrome.  She has had increased pain in the base of her left thumb and requesting a steroid injection at this visit. She has a history of fibromyalgia with intermittent myofascial symptoms generally stable on Lyrica and Cymbalta.  She notes increased symptoms with weather and seasonal changes. She has a history of secondary Sjogren's with chronic dry eye symptoms.  She is using eye drops as needed.    She has a history of osteoporosis.  She has been treated with IV Reclast and received her 3rd dose on 06/06/2022.  She has previously taken Fosamax in the past.  Her most recent DEXA scan was done on 11/12/2021.  Lumbar spine T-score-2.5 which has increased 4.2% as compared to her previous scan.  Left total hip T-score-1.7 which has increased 3.7% as compared to her previous scan.  Left femoral neck T-score -2.2.  Right total hip T-score-1.5 which has increased 5.8% as compared to her previous scan.  Right femoral neck T-score-1.6.  Forearm T-score-1.4.  She is overdue for updated DEXA, however, has not yet scheduled it as requested.    Lab work dated 1/25/2025 significant for CBC remarkable for borderline high MCV of 99.  White blood cell count 5.25 with absolute neutrophil count 1.58.  Increased lymphocytes of 56% with decreased segmented neutrophils of 30%.  Note patient had upper respiratory infection at the time of this lab draw.  Platelet count 278.  Estimated GFR 78.  QuantiFERON gold negative.  Urinalysis dipstick 1+ protein.  Total cholesterol 152.  Triglyceride 84.  HDL 45.  LDL 90.  Vitamin D borderline high at 101.6.  Sed rate 29.  CRP 2.9.  Review of lab work dated 8/2/2024 significant for CBC unremarkable.  Sed rate 24.  CRP less than 1.0.  Calcium 9.5.  Creatinine 0.78 with estimated GFR 82.  Urinalysis benign.  Review of lab work dated 3/23/2024 significant for CBC with  white blood cell count 5.09.  Absolute neutrophil count 1.74.  Eosinophils slightly high at 8%.  Sed rate 14.  CRP 1.3.  Estimated GFR 78.  Urinalysis trace protein.  Total cholesterol 176.  Triglyceride 117.  HDL 47.  .  QuantiFERON gold negative 11/7/2023.    X-rays dated 4/9/2024 of the right hip were unremarkable.  Thoracic spine films mild dextroconvex curvature with age-related degenerative changes.  LS-spine mild scoliosis with age-appropriate degenerative changes.        Allergies  No Known Allergies    Home Medications    Current Outpatient Medications:   •  Cholecalciferol (VITAMIN D) 2000 units tablet, Take 2 tablets by mouth daily, Disp: , Rfl:   •  DULoxetine (CYMBALTA) 60 mg delayed release capsule, Take 60 mg by mouth 2 (two) times a day , Disp: , Rfl:   •  eszopiclone (LUNESTA) 3 MG tablet, Take 3 mg by mouth daily at bedtime, Disp: , Rfl:   •  folic acid (FOLVITE) 1 mg tablet, TAKE ONE TABLET BY MOUTH EVERY DAY, Disp: 30 tablet, Rfl: 5  •  Hyrimoz 40 MG/0.4ML SOAJ, INJECT 1 PEN UNDER THE SKIN EVERY 14 DAYS, Disp: 0.4 mL, Rfl: 5  •  Magnesium 200 MG TABS, Take 1 tablet by mouth daily, Disp: , Rfl:   •  methotrexate 2.5 MG tablet, TAKE 8 TABLETS BY MOUTH ONCE A WEEK, Disp: 96 tablet, Rfl: 1  •  pregabalin (LYRICA) 100 mg capsule, Take 1 capsule by mouth 2 (two) times a day, Disp: , Rfl:   •  Sodium Fluoride (PreviDent 5000 Booster Plus) 1.1 % PSTE, Apply on teeth every evening as directed, Disp: 100 mL, Rfl: 3  •  Turmeric 450 MG CAPS, Take 1 capsule by mouth 2 (two) times a day, Disp: , Rfl:     Past Medical History  Past Medical History:   Diagnosis Date   • Anxiety    • Arthritis    • COVID 2021   • Depression    • Encounter for screening mammogram for breast cancer 10/22/2018   • Endometriosis    • Fibromyalgia, primary    • Ganglion cyst of finger of right hand 02/16/2023   • Hair loss 02/17/2022   • Headache(784.0)    • Memory loss    • Osteoarthritis    • Osteoporosis    • Rheumatoid  arthritis (HCC)    • Scoliosis    • Sjogren's syndrome (HCC)        Past Surgical History   Past Surgical History:   Procedure Laterality Date   • COLONOSCOPY  2016, 2018   • OOPHORECTOMY     • TONSILLECTOMY     • TOOTH EXTRACTION     • TOTAL ABDOMINAL HYSTERECTOMY  2005    at age 44       Family History   Family History   Problem Relation Age of Onset   • Hypertension Mother    • Hypothyroidism Mother    • Osteoporosis Mother    • Pulmonary embolism Mother    • Depression Mother    • Hearing loss Mother    • Lymphoma Father         chronic lymphoid leukemia   • Cancer Father         Chronic Leukemia and Lymphomas   • No Known Problems Sister    • Suicide Attempts Sister    • Emphysema Brother         lung   • Alcohol abuse Brother    • Suicide Attempts Brother    • No Known Problems Son    • No Known Problems Son    • Allergies Son    • Osteoporosis Maternal Grandmother    • Thyroid disease Maternal Grandmother    • Transient ischemic attack Maternal Grandmother    • No Known Problems Maternal Grandfather    • No Known Problems Paternal Grandmother    • No Known Problems Paternal Grandfather    • Thyroid cancer Maternal Aunt        The following portions of the patient's history were reviewed and updated as appropriate: allergies, current medications, past family history, past medical history, past social history, past surgical history, and problem list.    Review of Systems   Constitutional:  Negative for chills, fatigue and fever.   HENT:  Negative for hearing loss, sore throat and tinnitus.         Dry mouth on Prevident toothpaste and xylimelts  Dental exam with no new cavities   Eyes:  Negative for pain and visual disturbance.        Dry eyes   Respiratory:  Negative for cough and shortness of breath.    Cardiovascular:  Negative for chest pain and palpitations.   Gastrointestinal:  Negative for abdominal pain, nausea and vomiting.   Genitourinary:  Negative for difficulty urinating.   Musculoskeletal:   "Positive for arthralgias (Hip and back pain resolved.  First CMC arthralgias left much greater than right.), back pain (Better, sees muscle massage therapist) and myalgias. Negative for gait problem, joint swelling, neck pain and neck stiffness.   Skin:  Negative for rash.   Neurological:  Positive for numbness (Hands occasional) and headaches. Negative for dizziness, seizures and weakness.   Psychiatric/Behavioral:  Negative for decreased concentration and sleep disturbance.          Objective:      /76   Pulse 72   Ht 5' 3.5\" (1.613 m)   Wt 71.3 kg (157 lb 3.2 oz)   LMP  (LMP Unknown)   SpO2 97%   BMI 27.41 kg/m²          Physical Exam  Vitals reviewed.   Constitutional:       Appearance: Normal appearance.   HENT:      Head: Normocephalic.      Nose:      Comments: Nose and throat unremarkable.  Eyes:      Extraocular Movements: Extraocular movements intact.   Neck:      Comments: Without masses, thyromegaly, lymphadenopathy  Cardiovascular:      Rate and Rhythm: Normal rate and regular rhythm.   Pulmonary:      Breath sounds: Normal breath sounds.   Abdominal:      Palpations: Abdomen is soft.   Musculoskeletal:      Cervical back: Neck supple.      Comments: Neck slightly decreased lateral flexion.  Spasm noted posterior cervical and shoulder girdle muscles.  Shoulders full range of motion.  Elbows full range of motion.  Wrists full range of motion with no synovitis.  Tinel's positive bilateral wrists.  Hands squaring 1st carpometacarpal joints bilaterally with early Heberden's nodes with exquisite tenderness left first CMC joint.  No synovitis appreciated.  Ganglion cyst right index finger DIP joint.  Straight leg raising negative bilaterally.  Some spasm noted in the dorsal paraspinals and lumbosacral paraspinals.  Hips full range of motion.  Triggers bilateral trochanteric bursa.  Knees full range of motion with patellofemoral crepitus.  Ankles full range of motion.  Feet hallux valgus " deformities with overlapping.  No synovitis appreciated.    Skin:     General: Skin is warm and dry.   Neurological:      General: No focal deficit present.      Mental Status: She is alert.           Small joint arthrocentesis: L thumb CMC  [place] Protocol:  procedure performed by consultantConsent: Verbal consent obtained.  Risks and benefits: risks, benefits and alternatives were discussed  Consent given by: patient  Timeout called at: 3/24/2025 6:48 PM.  Patient understanding: patient states understanding of the procedure being performed  Patient consent: the patient's understanding of the procedure matches consent given  Procedure consent: procedure consent matches procedure scheduled  Relevant documents: relevant documents present and verified  Test results: test results available and properly labeled  Site marked: the operative site was marked  Radiology Images displayed and confirmed. If images not available, report reviewed: imaging studies available  Patient identity confirmed: verbally with patient  Supporting Documentation  Indications: joint swelling and pain   Procedure Details  Location: thumb - L thumb CMC  Preparation: Patient was prepped and draped in the usual sterile fashion  Needle size: 22 G  Ultrasound guidance: no  Approach: dorsal  Medications administered: 1 mL methylPREDNISolone acetate 40 mg/mL    Patient tolerance: patient tolerated the procedure well with no immediate complications  Dressing:  Sterile dressing applied     Told to ice 4 times a day over the next 4 days.           This note was written in part using the assistance of the [employer] [id number] microphone system. Those portions using this system have been dictated and not read.

## 2025-03-26 PROBLEM — M18.0 PRIMARY OSTEOARTHRITIS OF BOTH FIRST CARPOMETACARPAL JOINTS: Status: ACTIVE | Noted: 2025-03-26

## 2025-03-26 RX ORDER — METHYLPREDNISOLONE ACETATE 40 MG/ML
1 INJECTION, SUSPENSION INTRA-ARTICULAR; INTRALESIONAL; INTRAMUSCULAR; SOFT TISSUE
Status: COMPLETED | OUTPATIENT
Start: 2025-03-24 | End: 2025-03-24

## 2025-03-27 NOTE — ASSESSMENT & PLAN NOTE
Probable secondary Sjogren syndrome with sicca symptoms stable with eyedrops for dry eyes and PreviDent toothpaste and XyliMelts for xerostomia. Dental visits have been stable.  Encourage continued vigilant dental hygiene and follow-up with dentist every 6 months or sooner if needed.  Continue to monitor.

## 2025-03-27 NOTE — ASSESSMENT & PLAN NOTE
History of positive EMG for mild right carpal tunnel syndrome.  She does have occasional numbness in her hands right greater than left, not significant enough to consider carpal tunnel injections.  Encourage wrist splints for symptomatic relief.  Continue to monitor.

## 2025-03-27 NOTE — ASSESSMENT & PLAN NOTE
Rheumatoid arthritis well-controlled with Humira and weekly methotrexate.  No sign of active inflammation or synovitis on exam at this visit.  Continue to monitor her clinical response to treatment.  Monitor disease activity and medication toxicity with lab work as ordered.  Monitor QuantiFERON gold yearly.  She will be due for updated QuantiFERON gold in January 2026.  Follow-up 6 months or sooner if needed.

## 2025-03-27 NOTE — ASSESSMENT & PLAN NOTE
Myofascial symptoms generally stable with Lyrica and Cymbalta.  Encouraged home exercise program as tolerated.  Monitor labs for medication toxicity.  Follow-up 6 months or sooner if needed.  Continue to monitor.

## 2025-03-27 NOTE — ASSESSMENT & PLAN NOTE
Increased pain left first CMC joint with marked tenderness on exam at this visit.  Patient requesting steroid injection.  See procedure note.  She was referred to occupational therapy for treatment of CMC osteoarthritis.  She would benefit with thumb spica splints.  Continue to monitor.

## 2025-03-27 NOTE — ASSESSMENT & PLAN NOTE
Primary generalized osteoarthritis with first CMC joint arthralgias, left greater than right, with significant increase in left basal thumb joint pain.  See procedure note.  No current complaints of back or hip pain.  Review of lumbar spine and thoracic spine films significant for age-appropriate degenerative changes with mild thoracolumbar scoliosis. Would consider physical therapy if she had a return of hip and back pain.  Encourage home exercise program as tolerated.  Continue to monitor.

## 2025-03-27 NOTE — ASSESSMENT & PLAN NOTE
Osteoporosis treated with 3 yearly doses of IV Reclast completed in June 2022.  Prior history of Fosamax.  Most recent DEXA dated 11/12/2021 significant for lumbar spine T-score -2.5 with an increase of 4.2% as compared to the prior study.  Left hip total T-score -1.7 with an increase of 3.7% as compared to the prior study.  Left femoral neck T-score -2.2.  Right total hip T-score -1.5 with an increase of 5.8% skin prior study.  Right femoral neck T-score -1.6.  Forearm T-score -1.4.  She is overdue for updated DEXA but has not yet scheduled this requested.  Encouraged her to go for updated DEXA to further evaluate need for additional doses of IV Reclast.  Continue calcium and vitamin D supplement and home exercise program.  Continue to monitor.

## 2025-03-27 NOTE — ASSESSMENT & PLAN NOTE
First CMC osteoarthritis left greater than right with increased pain left basal thumb joint.  Patient is requesting steroid injection at this visit.  See procedure note.  Patient was given a prescription for occupational therapy for treatment of first CMC osteoarthritis.  She would benefit with thumb spica splints.  Continue to monitor.

## 2025-03-31 ENCOUNTER — TELEPHONE (OUTPATIENT)
Age: 65
End: 2025-03-31

## 2025-03-31 NOTE — TELEPHONE ENCOUNTER
----- Message from Keshia Zeng MD sent at 3/30/2025  6:03 PM EDT -----  Notify the patient to make sure she schedules her follow-up DEXA so that we can review it.

## 2025-03-31 NOTE — TELEPHONE ENCOUNTER
Called and spoke with the patient. I reminded her to schedule her Dexa scan and that the order is already in the system for her. I offered to give her Central Scheduling's number but she declined and stated that she will find it online.

## 2025-04-17 ENCOUNTER — TELEPHONE (OUTPATIENT)
Age: 65
End: 2025-04-17

## 2025-04-17 DIAGNOSIS — M81.0 SENILE OSTEOPOROSIS: Primary | ICD-10-CM

## 2025-04-17 NOTE — TELEPHONE ENCOUNTER
Patient called and stated her Dexa scan order is  and she requested a new order to schedule scan. Patient requested a call back once new order is available.

## 2025-05-28 NOTE — PROGRESS NOTES
OT Evaluation     Today's date: 2025  Patient name: Emy Sher  : 1960  MRN: [id number]  Referring provider: Keshia Zeng MD  Dx:   Encounter Diagnosis     ICD-10-CM    1. Primary osteoarthritis of both first carpometacarpal joints  M18.0           Start Time: 1200  Stop Time: 1300  Total time in clinic (min): 60 minutes    Assessment  Impairments: abnormal coordination, abnormal or restricted ROM, abnormal movement, activity intolerance, impaired physical strength, lacks appropriate home exercise program, pain with function, weight-bearing intolerance, unable to perform ADL and activity limitations    Assessment details: Pt presents for OT eval on 25 with b/l CMC arthritis. Subjectively, pt reports mod pain (~5-6/10) during activities regarding inc function of b/l thumbs. She reports difficulty with many ADL/IADL/work tasks at this time, including using her mouse, typing, opening jars, twisting door knobs, meal prep, cleaning and more. Objectively, pt presents with inc swelling/edema around L MCP joint compared to R MCP joint of thumb. Overall her ROM if fairly equal bilaterally and is WFL. She did demo varied thumb AROM on each UE (see objective section). She also displayed dec BUE  & pinch strength compared to norm values for her age/gender with inc pain reported.  Pt EDU on HEP that includes thumb/wrist AROM and thumb CMC arthritis exercises (see treatment diary). Pt also EDU on use of heat modality and paraffin wax. Also provided pt with foam tubing and Dycem for activity modification purposes. OT recommending 1-2 times week/ 4-5 weeks in order to attempt to dec pain/swelling and inc strength and overall function of pt's BUE, which attempt to inc pt's overall activity tolerance/performance in affected daily activities and work tasks. Pt understands/agrees with current POC.   Understanding of Dx/Px/POC: good     Prognosis: good    Goals  Short term (within 3-4 visits of IE):  Pt  will be indep with recc HEP focusing on wrist/thumb AROM and CMC arthritis exercises.  Pt will be EDU on appropriate activity modifications, modalities and manual techniques to dec current symptoms of pain and inc overall function.  Pt will display indep in the management and wearing schedule of their custom orthosis   Pt will display indep in appropriate edema/scar mob techniques   Long term (by time of d/c):  Pt will report dec pain (<4/10) during ADL, IADL, leisure & work tasks compared to IE to improve overall activity tolerance.  Pt will display dec edema of affected UE compared to IE (within 0.2 cm of unaffected side)  Pt will display maintained or inc AROM of affected UE compared to IE in order to inc ability to participate in ADL & IADL tasks   Pt will display inc  (>3 lbs) & pinch (>1 lbs) strength of affected UE compared to IE in order to return to full participation in affected ADL, IADL, leisure and work tasks  Pt will display inc FOTO score of 5 or more points or more to display overall inc in functional performance compared to IE.  Pt will report that she is able to complete at least 50% or more FMC tasks (ex. Crocheting, opening jars, etc.) without inc pain reported in b/l thumbs (may use AE if needed).  Pt will be agreeable to d/c from OT.     Plan  Patient would benefit from: skilled occupational therapy and custom splinting  Planned modality interventions: thermotherapy: hydrocollator packs, ultrasound and thermotherapy: paraffin bath    Planned therapy interventions: manual therapy, massage, joint mobilization, patient/caregiver education, therapeutic exercise, strengthening, stretching, graded exercise, IASTM, nerve gliding, flexibility, therapeutic activities, graded activity, functional ROM exercises, fine motor coordination training, activity modification, home exercise program, orthotic fitting/training, orthotic management and training, kinesiology taping, IADL retraining and ADL  retraining    Frequency: 1-2x week  Therapy duration (weeks): 4-6.  Treatment plan discussed with: patient        Subjective Evaluation    History of Present Illness  Mechanism of injury: Emy is a 65 y/o RHD female who presents for an OT eval with BUE CMC osteoarthritis. She has been dealing with arthritic symptoms in b/l thumbs for multiple years. She recently had Cortizone injection about 1 month ago in her L thumb CMC.     Occupational Profile  ADLs: difficulty opening bottles/jars, lifting/grabbing heavier objects, twisting knobs, etc.    IADLs: difficulty with meal pep, vacuuming, cleaning, housework    School: n/a    Driving: n/a    Sport/Leisure: difficulty knitting and crocheting    Work: works for insurance, difficulty typing and using mouse     Quality of life: good    Patient Goals  Patient goals for therapy: increased strength, decreased edema, decreased pain, increased motion, independence with ADLs/IADLs, return to work and return to sport/leisure activities    Pain  Current pain ratin  At best pain ratin  At worst pain ratin  Location: BUE CMC joints  Quality: dull ache  Relieving factors: heat, medications, support, relaxation and rest    Treatments  Current treatment: occupational therapy        Objective    Tissue Integrity: min-mod tenderness in BUE CMC joints    Sensation (Ten-Test )  Right Hand (thumb to small finger): 10/10, 10/10, 10/10, 10/10, 10/10  Left Hand (thumb to small finger): 10/10, 10/10, 10/10, 10/10, 10/10    Pt reports instances of inc tingling in b/l thumbs during sustained  activities     Special Tests: n/a    Edema (circumferential) (cm):    Right Left   Wrist crease 15.4 15.4   Thumb MCP joint 7.8 8.4       AROM     Elbow/Forearm   Right Left   Extension/Flexion WFL WFL   Supination WFL WFL   Pronation WFL WFL     Wrist   Right Left   Flexion 75 75   Extension 65 65   Radial Dev. 20 20   Ulnar Dev. 30 30     Right Hand (ext/flex)  Kapandji Score (Opposition)  10/10 (min inc in pain)     Left Hand (ext/flex)  Kapandji Score (opposition) 10/10 (min inc in pain)       Thumb   Right Left   MP  60 70   IP 77 65   Palmar Abd. 60 55   Radial Abd. 67 70     /Pinch Strength  Dynamometer R UE  L UE comments   Position #2 (lbs) 39.1 34.7     Pinch Meter          Lateral 11 6 Inc pain in LUE     3 JAW FAZAL 9 8      2-point 5  6             Precautions: B/l CMC osteoarthritis    Manuals  6/2 IE                 edema mobs                  STM/IASTM                                                                      Ther Ex      HEP: wrist/thumb AROM, butterfly stretch, CMC iso, 1st odrsal interossei ROM/ strengthening                 wrist AROM                  Thumb AROM              Digit opposition              TGEs              Thumb CMC arthritis exercises  -1st dorsal interossei  -butterfly stretch  -tennis ball glides  -tennis ball squeezes              Graded strengthening                                                                                                                    Ther Activity                   Activity modification [facility]                Adaptive equip [facility] Provided pt with Dycem; foam tubing                                                                                  Neuro Re-Ed                                                               Ortho Fit                  Thumb spica Will make in upcoming session                                            Modalities                  heat  5'                 paraffin  5'

## 2025-05-30 ENCOUNTER — OFFICE VISIT (OUTPATIENT)
Dept: PODIATRY | Facility: CLINIC | Age: 65
End: 2025-05-30
Payer: COMMERCIAL

## 2025-05-30 VITALS — WEIGHT: 155 LBS | HEIGHT: 64 IN | BODY MASS INDEX: 26.46 KG/M2

## 2025-05-30 DIAGNOSIS — M79.7 FIBROMYALGIA: Primary | ICD-10-CM

## 2025-05-30 DIAGNOSIS — M06.09 RHEUMATOID ARTHRITIS OF MULTIPLE SITES WITH NEGATIVE RHEUMATOID FACTOR (HCC): ICD-10-CM

## 2025-05-30 DIAGNOSIS — G57.63 LESION OF PLANTAR NERVE, BILATERAL LOWER LIMBS: ICD-10-CM

## 2025-05-30 PROCEDURE — 99203 OFFICE O/P NEW LOW 30 MIN: CPT | Performed by: PODIATRIST

## 2025-05-30 NOTE — PROGRESS NOTES
PATIENT:  Emy Sher  1960       ASSESSMENT:     1. Fibromyalgia        2. Rheumatoid arthritis of multiple sites with negative rheumatoid factor (HCC)        3. Lesion of plantar nerve, bilateral lower limbs                  PLAN:  1. Reviewed medical records.  Patient was counseled and educated on the condition and the diagnosis.    2. The diagnosis, treatment options and prognosis were discussed with the patient.    3. Her symptoms are most likely due to secondary interdigital nerve impingement from RA / DJD in MPJs.    4. Instructed supportive care, home exercise, and proper footwear/ arch support.     5. Consider X-ray and further management if her symptoms get worse in the future.    Imaging: I have personally reviewed pertinent films in PACS  Labs, pathology, and Other Studies: I have personally reviewed pertinent reports.        Subjective:       HPI  The patient presents with chief complaint of tingling and burning sensation around toes in the morning or after sitting for a while.  She noticed it more in the last 3 months.  Her symptoms usually last for 30 seconds when she stands and walk before it resolves on its own.  She also has some numbness around the toes when she has symptoms.  Denied any injury.  No history of diabetes. She has RA and fibromyalgia.  No significant weakness or dysfunction.         The following portions of the patient's history were reviewed and updated as appropriate: allergies, current medications, past family history, past medical history, past social history, past surgical history and problem list.  All pertinent labs and images were reviewed.      Past Medical History  Past Medical History[1]    Past Surgical History  Past Surgical History[2]     Allergies:  Patient has no known allergies.    Medications:  Current Medications[3]    Social History:  Social History[4]       Review of Systems   Constitutional:  Negative for chills and fever.  "  Respiratory:  Negative for cough and shortness of breath.    Cardiovascular:  Negative for chest pain.   Gastrointestinal:  Negative for nausea and vomiting.   Musculoskeletal:  Positive for arthralgias and myalgias. Negative for gait problem.   Skin:  Negative for wound.   Neurological:  Negative for weakness.   Hematological: Negative.    Psychiatric/Behavioral:  Negative for behavioral problems and confusion.          Objective:      Ht 5' 4\" (1.626 m)   Wt 70.3 kg (155 lb)   LMP  (LMP Unknown)   BMI 26.61 kg/m²          Physical Exam  Vitals reviewed.   Constitutional:       General: She is not in acute distress.     Appearance: She is not toxic-appearing or diaphoretic.   HENT:      Head: Normocephalic and atraumatic.     Eyes:      Extraocular Movements: Extraocular movements intact.       Cardiovascular:      Rate and Rhythm: Normal rate and regular rhythm.      Pulses: Normal pulses.           Dorsalis pedis pulses are 2+ on the right side and 2+ on the left side.        Posterior tibial pulses are 2+ on the right side and 2+ on the left side.   Pulmonary:      Effort: Pulmonary effort is normal. No respiratory distress.     Musculoskeletal:         General: Deformity present. No swelling or signs of injury.      Cervical back: Normal range of motion and neck supple.      Right lower leg: No edema.      Left lower leg: No edema.      Right foot: No foot drop.      Left foot: No foot drop.      Comments: Mild tenderness noted around 2nd and 3rd interspace.  No acute swelling, redness, or warmth of her joints.     Feet:      Right foot:      Protective Sensation: 10 sites tested.  10 sites sensed.      Left foot:      Protective Sensation: 10 sites tested.  10 sites sensed.     Skin:     General: Skin is warm.      Capillary Refill: Capillary refill takes less than 2 seconds.      Coloration: Skin is not cyanotic or mottled.      Findings: No abscess.      Nails: There is no clubbing.     Neurological:    "   General: No focal deficit present.      Mental Status: She is alert and oriented to person, place, and time.      Cranial Nerves: No cranial nerve deficit.      Sensory: No sensory deficit.      Motor: No weakness.      Coordination: Coordination normal.     Psychiatric:         Mood and Affect: Mood normal.         Behavior: Behavior normal.         Thought Content: Thought content normal.         Judgment: Judgment normal.                [1]   Past Medical History:  Diagnosis Date    Anxiety     Arthritis     COVID 2021    Depression     Encounter for screening mammogram for breast cancer 10/22/2018    Endometriosis     Fibromyalgia, primary     Ganglion cyst of finger of right hand 02/16/2023    Hair loss 02/17/2022    Headache(784.0)     Memory loss     Osteoarthritis     Osteoporosis     Rheumatoid arthritis (HCC)     Scoliosis     Sjogren's syndrome (HCC)    [2]   Past Surgical History:  Procedure Laterality Date    COLONOSCOPY  2016, 2018    OOPHORECTOMY      TONSILLECTOMY      TOOTH EXTRACTION      TOTAL ABDOMINAL HYSTERECTOMY  2005    at age 44   [3]   Current Outpatient Medications   Medication Sig Dispense Refill    Cholecalciferol (VITAMIN D) 2000 units tablet Take 2 tablets by mouth in the morning.      DULoxetine (CYMBALTA) 60 mg delayed release capsule Take 60 mg by mouth in the morning and 60 mg in the evening.      eszopiclone (LUNESTA) 3 MG tablet Take 3 mg by mouth daily at bedtime      folic acid (FOLVITE) 1 mg tablet TAKE ONE TABLET BY MOUTH EVERY DAY 30 tablet 5    Hyrimoz 40 MG/0.4ML SOAJ INJECT 1 PEN UNDER THE SKIN EVERY 14 DAYS 0.4 mL 5    Magnesium 200 MG TABS Take 1 tablet by mouth in the morning.      methotrexate 2.5 MG tablet TAKE 8 TABLETS BY MOUTH ONCE A WEEK 96 tablet 1    pregabalin (LYRICA) 100 mg capsule Take 1 capsule by mouth in the morning and 1 capsule in the evening.      Sodium Fluoride (PreviDent 5000 Booster Plus) 1.1 % PSTE Apply on teeth every evening as directed 100  mL 3    Turmeric 450 MG CAPS Take 1 capsule by mouth in the morning and 1 capsule in the evening.       No current facility-administered medications for this visit.   [4]   Social History  Socioeconomic History    Marital status: /Civil Union    Number of children: 3   Occupational History    Occupation: works for [employer]     Comment: FT, traveleld to [employer] [facility] [employer] [facility]    Occupation: full-time employment   Tobacco Use    Smoking status: Never    Smokeless tobacco: Never   Vaping Use    Vaping status: Never Used   Substance and Sexual Activity    Alcohol use: Yes     Comment: I have 2-3 standard drinks per month    Drug use: Never    Sexual activity: Not Currently     Partners: Male     Birth control/protection: Surgical, None     Comment: x 33 years   Social History Narrative    Daily caffeine consumption, 2-3 serving a day

## 2025-06-02 ENCOUNTER — EVALUATION (OUTPATIENT)
Dept: OCCUPATIONAL THERAPY | Age: 65
End: 2025-06-02
Payer: COMMERCIAL

## 2025-06-02 DIAGNOSIS — M18.0 PRIMARY OSTEOARTHRITIS OF BOTH FIRST CARPOMETACARPAL JOINTS: Primary | ICD-10-CM

## 2025-06-02 PROCEDURE — 97110 THERAPEUTIC EXERCISES: CPT

## 2025-06-02 PROCEDURE — 97165 OT EVAL LOW COMPLEX 30 MIN: CPT

## 2025-06-06 ENCOUNTER — OFFICE VISIT (OUTPATIENT)
Dept: OCCUPATIONAL THERAPY | Age: 65
End: 2025-06-06
Attending: INTERNAL MEDICINE
Payer: COMMERCIAL

## 2025-06-06 DIAGNOSIS — M18.0 PRIMARY OSTEOARTHRITIS OF BOTH FIRST CARPOMETACARPAL JOINTS: Primary | ICD-10-CM

## 2025-06-06 PROCEDURE — 97110 THERAPEUTIC EXERCISES: CPT

## 2025-06-06 PROCEDURE — 97763 ORTHC/PROSTC MGMT SBSQ ENC: CPT

## 2025-06-06 NOTE — PROGRESS NOTES
"Daily Note     Today's date: 2025  Patient name: Emy Sher  : 1960  MRN: [id number]  Referring provider: Keshia Zeng MD  Dx:   Encounter Diagnosis     ICD-10-CM    1. Primary osteoarthritis of both first carpometacarpal joints  M18.0           Start Time: 1145  Stop Time: 1230  Total time in clinic (min): 45 minutes    Subjective: \"I want to get a routine down for the exercises.\"      Objective: See treatment diary below      Assessment: Tolerated treatment well. Reviewed current HEP with pt today, which she appeared to understand well. Focused primarily on creating custom thumb spica orthosis for pt to wear on LUE in hope to dec symptoms of inc L thumb pain during function. EDU on management and wearing schedule of orthosis. Pt participated in all exercises today well, req min-mod rest breaks to manage fatigue in b/l thumbs.  Patient would benefit from continued OT      Plan: Continue per plan of care.  Progress treatment as tolerated.       Precautions: B/l CMC osteoarthritis    Manuals   IE                 edema mobs                  STM/IASTM                                                                      Ther Ex      HEP: wrist/thumb AROM, butterfly stretch, CMC iso, 1st odrsal interossei ROM/ strengthening                 wrist AROM    x15 wrist flex/ext; rad/uln              Thumb AROM  X15 rad abd, palmar abd, ext    X15 circum CW/CCW            Digit opposition  x15            TGEs              Thumb CMC arthritis exercises  -1st dorsal interossei  -butterfly stretch  -tennis ball glides  -tennis ball squeezes  X15 1st dorsal interossei thin RB    X15 tennis ball squeezes    X15 tennis ball glides            Graded strengthening                                                                                                                    Ther Activity                   Activity modification EDU                  [facility]                Adaptive equip EDU Provided pt with " Dycem; foam tubing                                                                                  Neuro Re-Ed                                                               Ortho Fit                  Thumb spica Will make in upcoming session 25' FA based thumb spica LUE                                           Modalities                  heat  5'  5'               paraffin  5'  5'

## 2025-06-08 NOTE — PROGRESS NOTES
"Daily Note     Today's date: 6/10/2025  Patient name: Emy Sher  : 1960  MRN: [id number]  Referring provider: Keshia Zeng MD  Dx:   Encounter Diagnosis     ICD-10-CM    1. Primary osteoarthritis of both first carpometacarpal joints  M18.0           Start Time: 1100  Stop Time: 1145  Total time in clinic (min): 45 minutes    Subjective: \"It's feeling a bit better than last week.\"      Objective: See treatment diary below      Assessment: Tolerated treatment well. Pt reports that she has had dec pain overall in her b/l thumbs, but still occasionally has aching pains throughout her day. She still reports having difficulty typing with her LUE with and without use of brace. Cont EDU on use of thumb spica splint, AE, activity modifications and more. Pt participated in all exercises wel, req min rest breaks to manage fatigue. Patient would benefit from continued OT      Plan: Continue per plan of care.  Progress treatment as tolerated.       Precautions: B/l CMC osteoarthritis    Manuals   IE  6/6  6/10             edema mobs      7'            STM/IASTM   8' CMC                                                                   Ther Ex      HEP: wrist/thumb AROM, butterfly stretch, CMC iso, 1st odrsal interossei ROM/ strengthening                 wrist AROM    x15 wrist flex/ext; rad/uln  xx15 wrist flex/ext; rad/uln            Thumb AROM  X15 rad abd, palmar abd, ext    X15 circum CW/CCW x20 rad abd, palmar abd, ext    X20 circum CW/CCW           Digit opposition  x15 x20           TGEs              Thumb CMC arthritis exercises  -1st dorsal interossei  -butterfly stretch  -tennis ball glides  -tennis ball squeezes  X15 1st dorsal interossei thin RB    X15 tennis ball squeezes    X15 tennis ball glides x15 1st dorsal interossei thin RB    X15 tennis ball squeezes    X15 tennis ball glides    X15 butterfly stretch           Graded strengthening                                                      "                                                               Ther Activity                   Activity modification EDU      3' EDU            [facility]                Adaptive equip [facility] Provided pt with Manpreet; foam tubing                                                                                  Neuro Re-Ed                                                               Ortho Fit                  Thumb spica Will make in upcoming session 25' FA based thumb spica LUE                                           Modalities                  heat  5'  5'  5'             paraffin  5'  5'  5'

## 2025-06-10 ENCOUNTER — OFFICE VISIT (OUTPATIENT)
Dept: OCCUPATIONAL THERAPY | Age: 65
End: 2025-06-10
Attending: INTERNAL MEDICINE
Payer: COMMERCIAL

## 2025-06-10 DIAGNOSIS — M18.0 PRIMARY OSTEOARTHRITIS OF BOTH FIRST CARPOMETACARPAL JOINTS: Primary | ICD-10-CM

## 2025-06-10 PROCEDURE — 97140 MANUAL THERAPY 1/> REGIONS: CPT

## 2025-06-10 PROCEDURE — 97110 THERAPEUTIC EXERCISES: CPT

## 2025-06-16 NOTE — PROGRESS NOTES
"Daily Note     Today's date: 2025  Patient name: Emy Sher  : 1960  MRN: [id number]  Referring provider: Keshia Zeng MD  Dx:   Encounter Diagnosis     ICD-10-CM    1. Primary osteoarthritis of both first carpometacarpal joints  M18.0           Start Time: 1145  Stop Time: 1230  Total time in clinic (min): 45 minutes    Subjective: \"They have felt alright the past week, I may have overdone it a little.\"      Objective: See treatment diary below      Assessment: Tolerated treatment well. Pt still reports instances of pain in b/l thumbs at this time, mostly in her L thumb. Encouraged pt to attempt to wear thumb spica brace more often for activities such as crocheting. Cont EDU on activity modification strategies and joint protection techniques to attempt to reduce thumb pain moving forward. Pt appeared receptive to cont EDU. Pt participated in all exercises well today, reporting min pain during CMC iso exercises. EDU on downgrading exercise to attempt to reduce pain for HEP. Patient would benefit from continued OT      Plan: Continue per plan of care.  Progress treatment as tolerated.       Precautions: B/l CMC osteoarthritis    Manuals   IE  6/6  6/10  6/17           edema mobs      7'  7' BUE          STM/IASTM   8' CMC 8' CMC BUE                                                                  Ther Ex      HEP: wrist/thumb AROM, butterfly stretch, CMC iso, 1st odrsal interossei ROM/ strengthening                 wrist AROM    x15 wrist flex/ext; rad/uln x15 wrist flex/ext; rad/uln  x15 wrist flex/ext; rad/uln          Thumb AROM  X15 rad abd, palmar abd, ext    X15 circum CW/CCW x20 rad abd, palmar abd, ext    X20 circum CW/CCW x20 rad abd, palmar abd, ext    X20 circum CW/CCW          Digit opposition  x15 x20 x20          TGEs              Thumb CMC arthritis exercises  -1st dorsal interossei  -butterfly stretch  -tennis ball glides  -tennis ball squeezes  X15 1st dorsal interossei " thin RB    X15 tennis ball squeezes    X15 tennis ball glides x15 1st dorsal interossei thin RB    X15 tennis ball squeezes    X15 tennis ball glides    X15 butterfly stretch x15 1st dorsal interossei thin RB    X15 tennis ball squeezes    X15 tennis ball glides    X15 butterfly stretch          Graded strengthening                                                                                                                    Ther Activity                   Activity modification EDU      3' EDU 2'          [facility]                Adaptive equip EDU Provided pt with Dycem; foam tubing               Joint protection EDU     2''                                                             Neuro Re-Ed                                                               Ortho Fit                  Thumb spica Will make in upcoming session 25' FA based thumb spica LUE                                           Modalities                  heat  5'  5'  5'  5'           paraffin  5'  5'  5'  5'

## 2025-06-17 ENCOUNTER — OFFICE VISIT (OUTPATIENT)
Dept: OCCUPATIONAL THERAPY | Age: 65
End: 2025-06-17
Attending: INTERNAL MEDICINE
Payer: COMMERCIAL

## 2025-06-17 DIAGNOSIS — M18.0 PRIMARY OSTEOARTHRITIS OF BOTH FIRST CARPOMETACARPAL JOINTS: Primary | ICD-10-CM

## 2025-06-17 PROCEDURE — 97140 MANUAL THERAPY 1/> REGIONS: CPT

## 2025-06-17 PROCEDURE — 97110 THERAPEUTIC EXERCISES: CPT

## 2025-06-19 NOTE — PROGRESS NOTES
"Daily Note     Today's date: 2025  Patient name: Emy Sher  : 1960  MRN: [id number]  Referring provider: Keshia Zeng MD  Dx:   Encounter Diagnosis     ICD-10-CM    1. Primary osteoarthritis of both first carpometacarpal joints  M18.0           Start Time: 1145  Stop Time: 1230  Total time in clinic (min): 45 minutes    Subjective: \"I have had less pain overall in the past couple of weeks.\"      Objective: See treatment diary below      Assessment: Tolerated treatment well. Pt cont to report dec symptoms overall in b/l thumbs using activity modification strategies. Pt still demos inc tightness in her R thumb webspace, cont EDU on STM techniques and use of chip clips if possible. Pt req min rest breaks to manage fatigue throughout session. Planning on OT-discharge next session.  Patient would benefit from continued OT      Plan: Continue per plan of care.  Progress treatment as tolerated.       Precautions: B/l CMC osteoarthritis    Manuals   IE  6/6  6/10  6/17  6/24 FOTO         edema mobs      7'  7' BUE  7' BUE        STM/IASTM   8' CMC 8' CMC BUE 8' CMC BUE                                                                 Ther Ex      HEP: wrist/thumb AROM, butterfly stretch, CMC iso, 1st odrsal interossei ROM/ strengthening                 wrist AROM    x15 wrist flex/ext; rad/uln x15 wrist flex/ext; rad/uln  x15 wrist flex/ext; rad/uln  x15 wrist flex/ext; rad/uln        Thumb AROM  X15 rad abd, palmar abd, ext    X15 circum CW/CCW x20 rad abd, palmar abd, ext    X20 circum CW/CCW x20 rad abd, palmar abd, ext    X20 circum CW/CCW  x20 rad abd, palmar abd, ext    X20 circum CW/CCW        Digit opposition  x15 x20 x20  x20        TGEs              Thumb CMC arthritis exercises  -1st dorsal interossei  -butterfly stretch  -tennis ball glides  -tennis ball squeezes  X15 1st dorsal interossei thin RB    X15 tennis ball squeezes    X15 tennis ball glides x15 1st dorsal interossei thin " RB    X15 tennis ball squeezes    X15 tennis ball glides    X15 butterfly stretch x15 1st dorsal interossei thin RB    X15 tennis ball squeezes    X15 tennis ball glides    X15 butterfly stretch  x15 1st dorsal interossei thin RB    X15 tennis ball squeezes    X15 tennis ball glides    X15 butterfly stretch        Graded strengthening                                                                                                                    Ther Activity                   Activity modification EDU      3' EDU 2'  5'        FMC                Adaptive equip EDU Provided pt with Dycem; foam tubing               Joint protection EDU     2''                                                             Neuro Re-Ed                                                               Ortho Fit                  Thumb spica Will make in upcoming session 25' FA based thumb spica LUE                                           Modalities                  heat  5'  5'  5'  5'  5'         paraffin  5'  5'  5'  5'  5'

## 2025-06-24 ENCOUNTER — OFFICE VISIT (OUTPATIENT)
Dept: OCCUPATIONAL THERAPY | Age: 65
End: 2025-06-24
Attending: INTERNAL MEDICINE
Payer: COMMERCIAL

## 2025-06-24 DIAGNOSIS — M18.0 PRIMARY OSTEOARTHRITIS OF BOTH FIRST CARPOMETACARPAL JOINTS: Primary | ICD-10-CM

## 2025-06-24 PROCEDURE — 97140 MANUAL THERAPY 1/> REGIONS: CPT

## 2025-06-24 PROCEDURE — 97110 THERAPEUTIC EXERCISES: CPT

## 2025-07-02 ENCOUNTER — OFFICE VISIT (OUTPATIENT)
Dept: OCCUPATIONAL THERAPY | Age: 65
End: 2025-07-02
Attending: INTERNAL MEDICINE
Payer: COMMERCIAL

## 2025-07-02 DIAGNOSIS — M18.0 PRIMARY OSTEOARTHRITIS OF BOTH FIRST CARPOMETACARPAL JOINTS: Primary | ICD-10-CM

## 2025-07-02 PROCEDURE — 97140 MANUAL THERAPY 1/> REGIONS: CPT

## 2025-07-02 PROCEDURE — 97110 THERAPEUTIC EXERCISES: CPT

## 2025-07-02 NOTE — PROGRESS NOTES
OT Discharge     Today's date: 2025  Patient name: Emy Sher  : 1960  MRN: [id number]  Referring provider: Keshia Zeng MD  Dx:   Encounter Diagnosis     ICD-10-CM    1. Primary osteoarthritis of both first carpometacarpal joints  M18.0                      Assessment  Impairments: abnormal or restricted ROM, impaired physical strength and pain with function    Assessment details: Pt presents for OT eval on 25 with b/l CMC arthritis. Subjectively, pt reports mod pain (~5-6/10) during activities regarding inc function of b/l thumbs. She reports difficulty with many ADL/IADL/work tasks at this time, including using her mouse, typing, opening jars, twisting door knobs, meal prep, cleaning and more. Objectively, pt presents with inc swelling/edema around L MCP joint compared to R MCP joint of thumb. Overall her ROM if fairly equal bilaterally and is WFL. She did demo varied thumb AROM on each UE (see objective section). She also displayed dec BUE  & pinch strength compared to norm values for her age/gender with inc pain reported.  Pt EDU on HEP that includes thumb/wrist AROM and thumb CMC arthritis exercises (see treatment diary). Pt also EDU on use of heat modality and paraffin wax. Also provided pt with foam tubing and Dycem for activity modification purposes. OT recommending 1-2 times week/ 4-5 weeks in order to attempt to dec pain/swelling and inc strength and overall function of pt's BUE, which attempt to inc pt's overall activity tolerance/performance in affected daily activities and work tasks. Pt understands/agrees with current POC.     25: Patient seen 6 times in OT. She is compliant with HEP. Patient reports significant reduction in b/l thumb pain and in hand paresthesia. She met her functional outcome goals on FOTO. No edema noted. ROM is WFL except for some MP hyperextension in the left MP joint. Strength is WFL. HEP updated to include soft theraputty for hand  strengthening 1x/day. Recommended Comfort Cool CMC support for light daytime support. DC to HEP  Understanding of Dx/Px/POC: good     Prognosis: good    Goals  Short term (within 3-4 visits of IE):  Pt will be indep with [facility] HEP focusing on wrist/thumb AROM and CMC arthritis exercises. MET  Pt will be EDU on appropriate activity modifications, modalities and manual techniques to dec current symptoms of pain and inc overall function. MET  Pt will display indep in the management and wearing schedule of their custom orthosis MET  Pt will display indep in appropriate edema/scar mob techniques MET  Long term (by time of d/c):  Pt will report dec pain (<4/10) during ADL, IADL, leisure & work tasks compared to IE to improve overall activity tolerance. MET  Pt will display dec edema of affected UE compared to IE (within 0.2 cm of unaffected side) MET  Pt will display maintained or inc AROM of affected UE compared to IE in order to inc ability to participate in ADL & IADL tasks  MET  Pt will display inc  (>3 lbs) & pinch (>1 lbs) strength of affected UE compared to IE in order to return to full participation in affected ADL, IADL, leisure and work tasks PARTIALLY MET  Pt will display inc FOTO score of 5 or more points or more to display overall inc in functional performance compared to IE. MET  Pt will report that she is able to complete at least 50% or more FMC tasks (ex. Crocheting, opening jars, etc.) without inc pain reported in b/l thumbs (may use AE if needed). MET  Pt will be agreeable to d/c from OT. MET    Plan    Therapy duration (weeks): 4-6.  Treatment plan discussed with: patient  Plan details: 7/2/25: DC to HEP        Subjective Evaluation    History of Present Illness  Mechanism of injury: Emy is a 63 y/o RHD female who presents for an OT eval with BUE CMC osteoarthritis. She has been dealing with arthritic symptoms in b/l thumbs for multiple years. She recently had Cortizone injection about 1 month ago  in her L thumb CMC.     Occupational Profile  ADLs: difficulty opening bottles/jars, lifting/grabbing heavier objects, twisting knobs, etc.    IADLs: difficulty with meal pep, vacuuming, cleaning, housework    School: n/a    Driving: n/a    Sport/Leisure: difficulty knitting and crocheting    Work: works for insurance, difficulty typing and using mouse    25: I'm doing good. The numbness and tingling is better     Quality of life: good    Patient Goals  Patient goals for therapy: increased strength, decreased edema, decreased pain, increased motion, independence with ADLs/IADLs, return to work and return to sport/leisure activities    Pain  Current pain ratin  At best pain ratin  At worst pain ratin  Location: BUE CMC joints  Quality: dull ache  Relieving factors: heat, medications, support, relaxation and rest  Exacerbated by: repetitive small motor tasks.  Progression: improved    Treatments  Current treatment: occupational therapy        Objective    Tissue Integrity: min-mod tenderness in BUE CMC joints    Sensation (Ten-Test )  Right Hand (thumb to small finger): 10/10, 10/10, 10/10, 10/10, 1010  Left Hand (thumb to small finger): 10/10, 10/10, 10/10, 10/10, 10/10    Pt reports instances of inc tingling in b/l thumbs during sustained  activities     Special Tests: n/a    Edema (circumferential) (cm):    Right Left   Wrist crease 15.4 15.4   Thumb MCP joint 7.5 7.9       AROM     Elbow/Forearm   Right Left   Extension/Flexion WFL WFL   Supination WFL WFL   Pronation WFL WFL     Wrist   Right Left   Flexion 75 75   Extension 65 65   Radial Dev. 20 20   Ulnar Dev. 30 30     Right Hand (ext/flex)  Kapandji Score (Opposition) 10/10      Left Hand (ext/flex)  Kapandji Score (opposition) 10/10        Thumb   Right Left   MP  70 75   IP 77 75   Palmar Abd. 60 55   Radial Abd. 60 60     /Pinch Strength  Dynamometer R UE  L UE comments   Position #2 (lbs) 47.8 31.5 Improved R; dec L    Pinch  Meter          Lateral 11 9 R SQ; L imp 3#    3 JAW FAZAL 9 9 R SQ; L imp 1#     2-point 6  5 R imp 1#; L dec 1#            Precautions: B/l CMC osteoarthritis    Manuals  6/2 IE  6/6  6/10  6/17  6/24 FOTO 7/2/25 DC        edema mobs      7'  7' BUE  7' BUE 5'       STM/IASTM   8' CMC 8' CMC BUE 8' CMC BUE 5' b/l CMC                                                                Ther Ex      HEP: wrist/thumb AROM, butterfly stretch, CMC iso, 1st odrsal interossei ROM/ strengthening         Soft theraputty for  and pinch strength; Comfort Cool CMC support recommended        wrist AROM    x15 wrist flex/ext; rad/uln x15 wrist flex/ext; rad/uln  x15 wrist flex/ext; rad/uln  x15 wrist flex/ext; rad/uln X15 all motions       Thumb AROM  X15 rad abd, palmar abd, ext    X15 circum CW/CCW x20 rad abd, palmar abd, ext    X20 circum CW/CCW x20 rad abd, palmar abd, ext    X20 circum CW/CCW  x20 rad abd, palmar abd, ext    X20 circum CW/CCW X20 rad/palm abd      C20 CW, CCW       Digit opposition  x15 x20 x20  x20 x20       TGEs              Thumb CMC arthritis exercises  -1st dorsal interossei  -butterfly stretch  -tennis ball glides  -tennis ball squeezes  X15 1st dorsal interossei thin RB    X15 tennis ball squeezes    X15 tennis ball glides x15 1st dorsal interossei thin RB    X15 tennis ball squeezes    X15 tennis ball glides    X15 butterfly stretch x15 1st dorsal interossei thin RB    X15 tennis ball squeezes    X15 tennis ball glides    X15 butterfly stretch  x15 1st dorsal interossei thin RB    X15 tennis ball squeezes    X15 tennis ball glides    X15 butterfly stretch        Graded strengthening      Yellow theraputty , roll, pinch x 20 reps b/l                                                                                                              Ther Activity                   Activity modification EDU      3' EDU 2'  5'        FMC                Adaptive equip EDU Provided pt with Dycem; foam tubing                Joint protection EDU     2''                                                             Neuro Re-Ed                                                               Ortho Fit                  Thumb spica Will make in upcoming session 25' FA based thumb spica LUE                                           Modalities                  heat  5'  5'  5'  5'  5' 5' pre TE        paraffin  5'  5'  5'  5'  5'

## 2025-07-17 ENCOUNTER — HOSPITAL ENCOUNTER (OUTPATIENT)
Dept: RADIOLOGY | Age: 65
Discharge: HOME | End: 2025-07-17
Attending: INTERNAL MEDICINE
Payer: COMMERCIAL

## 2025-07-17 VITALS — HEIGHT: 65 IN | WEIGHT: 157 LBS | BODY MASS INDEX: 26.16 KG/M2

## 2025-07-17 DIAGNOSIS — M81.0 SENILE OSTEOPOROSIS: ICD-10-CM

## 2025-07-17 PROCEDURE — 77080 DXA BONE DENSITY AXIAL: CPT

## 2025-08-04 ENCOUNTER — RESULTS FOLLOW-UP (OUTPATIENT)
Age: 65
End: 2025-08-04